# Patient Record
Sex: FEMALE | Race: WHITE | NOT HISPANIC OR LATINO | Employment: FULL TIME | URBAN - METROPOLITAN AREA
[De-identification: names, ages, dates, MRNs, and addresses within clinical notes are randomized per-mention and may not be internally consistent; named-entity substitution may affect disease eponyms.]

---

## 2018-05-30 ENCOUNTER — HISTORICAL (OUTPATIENT)
Dept: LAB | Facility: HOSPITAL | Age: 43
End: 2018-05-30

## 2018-05-30 LAB
ABS NEUT (OLG): 2.44 X10(3)/MCL (ref 2.1–9.2)
ALBUMIN SERPL-MCNC: 4.2 GM/DL (ref 3.4–5)
ALBUMIN/GLOB SERPL: 1.3 {RATIO}
ALP SERPL-CCNC: 99 UNIT/L (ref 38–126)
ALT SERPL-CCNC: 35 UNIT/L (ref 12–78)
AST SERPL-CCNC: 18 UNIT/L (ref 15–37)
BASOPHILS # BLD AUTO: 0 X10(3)/MCL (ref 0–0.2)
BASOPHILS NFR BLD AUTO: 0 %
BILIRUB SERPL-MCNC: 0.5 MG/DL (ref 0.2–1)
BILIRUBIN DIRECT+TOT PNL SERPL-MCNC: 0.1 MG/DL (ref 0–0.2)
BILIRUBIN DIRECT+TOT PNL SERPL-MCNC: 0.4 MG/DL (ref 0–0.8)
BUN SERPL-MCNC: 6 MG/DL (ref 7–18)
CALCIUM SERPL-MCNC: 8.9 MG/DL (ref 8.5–10.1)
CHLORIDE SERPL-SCNC: 101 MMOL/L (ref 98–107)
CHOLEST SERPL-MCNC: 212 MG/DL (ref 0–200)
CHOLEST/HDLC SERPL: 4.5 {RATIO} (ref 0–4)
CO2 SERPL-SCNC: 29 MMOL/L (ref 21–32)
CREAT SERPL-MCNC: 0.69 MG/DL (ref 0.55–1.02)
EOSINOPHIL # BLD AUTO: 0 X10(3)/MCL (ref 0–0.9)
EOSINOPHIL NFR BLD AUTO: 1 %
ERYTHROCYTE [DISTWIDTH] IN BLOOD BY AUTOMATED COUNT: 12 % (ref 11.5–17)
GLOBULIN SER-MCNC: 3.3 GM/DL (ref 2.4–3.5)
GLUCOSE SERPL-MCNC: 76 MG/DL (ref 74–106)
HCT VFR BLD AUTO: 45 % (ref 37–47)
HDLC SERPL-MCNC: 47 MG/DL (ref 35–60)
HGB BLD-MCNC: 15.2 GM/DL (ref 12–16)
LDLC SERPL CALC-MCNC: 138 MG/DL (ref 0–129)
LYMPHOCYTES # BLD AUTO: 2.4 X10(3)/MCL (ref 0.6–4.6)
LYMPHOCYTES NFR BLD AUTO: 44 %
MCH RBC QN AUTO: 33.4 PG (ref 27–31)
MCHC RBC AUTO-ENTMCNC: 33.8 GM/DL (ref 33–36)
MCV RBC AUTO: 98.9 FL (ref 80–94)
MONOCYTES # BLD AUTO: 0.6 X10(3)/MCL (ref 0.1–1.3)
MONOCYTES NFR BLD AUTO: 10 %
NEUTROPHILS # BLD AUTO: 2.44 X10(3)/MCL (ref 2.1–9.2)
NEUTROPHILS NFR BLD AUTO: 44 %
PLATELET # BLD AUTO: 218 X10(3)/MCL (ref 130–400)
PMV BLD AUTO: 10.6 FL (ref 9.4–12.4)
POTASSIUM SERPL-SCNC: 3.6 MMOL/L (ref 3.5–5.1)
PROT SERPL-MCNC: 7.5 GM/DL (ref 6.4–8.2)
RBC # BLD AUTO: 4.55 X10(6)/MCL (ref 4.2–5.4)
SODIUM SERPL-SCNC: 140 MMOL/L (ref 136–145)
TRIGL SERPL-MCNC: 134 MG/DL (ref 30–150)
TSH SERPL-ACNC: 0.99 MIU/L (ref 0.36–3.74)
VLDLC SERPL CALC-MCNC: 27 MG/DL
WBC # SPEC AUTO: 5.5 X10(3)/MCL (ref 4.5–11.5)

## 2019-05-14 ENCOUNTER — HISTORICAL (OUTPATIENT)
Dept: LAB | Facility: HOSPITAL | Age: 44
End: 2019-05-14

## 2019-05-14 LAB
ABS NEUT (OLG): 7 X10(3)/MCL (ref 2.1–9.2)
ALBUMIN SERPL-MCNC: 3.7 GM/DL (ref 3.4–5)
ALBUMIN/GLOB SERPL: 1.2 RATIO (ref 1.1–2)
ALP SERPL-CCNC: 94 UNIT/L (ref 38–126)
ALT SERPL-CCNC: 23 UNIT/L (ref 12–78)
AST SERPL-CCNC: 13 UNIT/L (ref 15–37)
BASOPHILS # BLD AUTO: 0 X10(3)/MCL (ref 0–0.2)
BASOPHILS NFR BLD AUTO: 0 %
BILIRUB SERPL-MCNC: 0.4 MG/DL (ref 0.2–1)
BILIRUBIN DIRECT+TOT PNL SERPL-MCNC: 0.1 MG/DL (ref 0–0.5)
BILIRUBIN DIRECT+TOT PNL SERPL-MCNC: 0.3 MG/DL (ref 0–0.8)
BUN SERPL-MCNC: 7 MG/DL (ref 7–18)
CALCIUM SERPL-MCNC: 8.9 MG/DL (ref 8.5–10.1)
CHLORIDE SERPL-SCNC: 106 MMOL/L (ref 98–107)
CHOLEST SERPL-MCNC: 179 MG/DL (ref 0–200)
CHOLEST/HDLC SERPL: 3.4 {RATIO} (ref 0–4)
CO2 SERPL-SCNC: 31 MMOL/L (ref 21–32)
CREAT SERPL-MCNC: 0.68 MG/DL (ref 0.55–1.02)
EOSINOPHIL # BLD AUTO: 0.1 X10(3)/MCL (ref 0–0.9)
EOSINOPHIL NFR BLD AUTO: 1 %
ERYTHROCYTE [DISTWIDTH] IN BLOOD BY AUTOMATED COUNT: 12.5 % (ref 11.5–17)
GLOBULIN SER-MCNC: 3.1 GM/DL (ref 2.4–3.5)
GLUCOSE SERPL-MCNC: 92 MG/DL (ref 74–106)
HCT VFR BLD AUTO: 45.5 % (ref 37–47)
HDLC SERPL-MCNC: 52 MG/DL (ref 35–60)
HGB BLD-MCNC: 15 GM/DL (ref 12–16)
LDLC SERPL CALC-MCNC: 106 MG/DL (ref 0–129)
LYMPHOCYTES # BLD AUTO: 2 X10(3)/MCL (ref 0.6–4.6)
LYMPHOCYTES NFR BLD AUTO: 20 %
MCH RBC QN AUTO: 34.6 PG (ref 27–31)
MCHC RBC AUTO-ENTMCNC: 33 GM/DL (ref 33–36)
MCV RBC AUTO: 104.8 FL (ref 80–94)
MONOCYTES # BLD AUTO: 0.8 X10(3)/MCL (ref 0.1–1.3)
MONOCYTES NFR BLD AUTO: 8 %
NEUTROPHILS # BLD AUTO: 7 X10(3)/MCL (ref 2.1–9.2)
NEUTROPHILS NFR BLD AUTO: 70 %
PLATELET # BLD AUTO: 263 X10(3)/MCL (ref 130–400)
PMV BLD AUTO: 10.8 FL (ref 9.4–12.4)
POTASSIUM SERPL-SCNC: 3.6 MMOL/L (ref 3.5–5.1)
PROT SERPL-MCNC: 6.8 GM/DL (ref 6.4–8.2)
RBC # BLD AUTO: 4.34 X10(6)/MCL (ref 4.2–5.4)
SODIUM SERPL-SCNC: 139 MMOL/L (ref 136–145)
TRIGL SERPL-MCNC: 103 MG/DL (ref 30–150)
TSH SERPL-ACNC: 0.92 MIU/L (ref 0.36–3.74)
VLDLC SERPL CALC-MCNC: 21 MG/DL
WBC # SPEC AUTO: 9.9 X10(3)/MCL (ref 4.5–11.5)

## 2021-04-01 ENCOUNTER — HISTORICAL (OUTPATIENT)
Dept: RADIOLOGY | Facility: HOSPITAL | Age: 46
End: 2021-04-01

## 2021-05-11 ENCOUNTER — HISTORICAL (OUTPATIENT)
Dept: ADMINISTRATIVE | Facility: HOSPITAL | Age: 46
End: 2021-05-11

## 2021-05-11 LAB
ABS NEUT (OLG): 5.27 X10(3)/MCL (ref 2.1–9.2)
ALBUMIN SERPL-MCNC: 4.1 GM/DL (ref 3.5–5)
ALBUMIN/GLOB SERPL: 1.4 RATIO (ref 1.1–2)
ALP SERPL-CCNC: 130 UNIT/L (ref 40–150)
ALT SERPL-CCNC: 23 UNIT/L (ref 0–55)
AST SERPL-CCNC: 19 UNIT/L (ref 5–34)
BASOPHILS # BLD AUTO: 0 X10(3)/MCL (ref 0–0.2)
BASOPHILS NFR BLD AUTO: 0 %
BILIRUB SERPL-MCNC: 0.4 MG/DL
BILIRUBIN DIRECT+TOT PNL SERPL-MCNC: 0.2 MG/DL (ref 0–0.5)
BILIRUBIN DIRECT+TOT PNL SERPL-MCNC: 0.2 MG/DL (ref 0–0.8)
BUN SERPL-MCNC: 6 MG/DL (ref 7–18.7)
CALCIUM SERPL-MCNC: 9.7 MG/DL (ref 8.4–10.2)
CHLORIDE SERPL-SCNC: 103 MMOL/L (ref 98–107)
CHOLEST SERPL-MCNC: 201 MG/DL
CHOLEST/HDLC SERPL: 4 {RATIO} (ref 0–5)
CO2 SERPL-SCNC: 29 MMOL/L (ref 22–29)
CREAT SERPL-MCNC: 0.84 MG/DL (ref 0.55–1.02)
EOSINOPHIL # BLD AUTO: 0.1 X10(3)/MCL (ref 0–0.9)
EOSINOPHIL NFR BLD AUTO: 1 %
ERYTHROCYTE [DISTWIDTH] IN BLOOD BY AUTOMATED COUNT: 12.8 % (ref 11.5–17)
GLOBULIN SER-MCNC: 3 GM/DL (ref 2.4–3.5)
GLUCOSE SERPL-MCNC: 95 MG/DL (ref 74–100)
HCT VFR BLD AUTO: 45.3 % (ref 37–47)
HDLC SERPL-MCNC: 47 MG/DL (ref 35–60)
HGB BLD-MCNC: 14.9 GM/DL (ref 12–16)
LDLC SERPL CALC-MCNC: 124 MG/DL (ref 50–140)
LYMPHOCYTES # BLD AUTO: 2.9 X10(3)/MCL (ref 0.6–4.6)
LYMPHOCYTES NFR BLD AUTO: 33 %
MCH RBC QN AUTO: 33.5 PG (ref 27–31)
MCHC RBC AUTO-ENTMCNC: 32.9 GM/DL (ref 33–36)
MCV RBC AUTO: 101.8 FL (ref 80–94)
MONOCYTES # BLD AUTO: 0.5 X10(3)/MCL (ref 0.1–1.3)
MONOCYTES NFR BLD AUTO: 6 %
NEUTROPHILS # BLD AUTO: 5.27 X10(3)/MCL (ref 2.1–9.2)
NEUTROPHILS NFR BLD AUTO: 60 %
PLATELET # BLD AUTO: 251 X10(3)/MCL (ref 130–400)
PMV BLD AUTO: 10.6 FL (ref 9.4–12.4)
POTASSIUM SERPL-SCNC: 3.9 MMOL/L (ref 3.5–5.1)
PROT SERPL-MCNC: 7.1 GM/DL (ref 6.4–8.3)
RBC # BLD AUTO: 4.45 X10(6)/MCL (ref 4.2–5.4)
SODIUM SERPL-SCNC: 141 MMOL/L (ref 136–145)
TRIGL SERPL-MCNC: 152 MG/DL (ref 37–140)
TSH SERPL-ACNC: 0.78 UIU/ML (ref 0.35–4.94)
VLDLC SERPL CALC-MCNC: 30 MG/DL
WBC # SPEC AUTO: 8.8 X10(3)/MCL (ref 4.5–11.5)

## 2021-08-13 LAB — RAPID GROUP A STREP (OHS): NEGATIVE

## 2021-08-15 LAB
RAPID GROUP A STREP (OHS): NEGATIVE
SARS-COV-2 RNA RESP QL NAA+PROBE: NEGATIVE

## 2021-12-27 ENCOUNTER — HISTORICAL (OUTPATIENT)
Dept: ADMINISTRATIVE | Facility: HOSPITAL | Age: 46
End: 2021-12-27

## 2021-12-27 LAB
ABS NEUT (OLG): 6.96 X10(3)/MCL (ref 2.1–9.2)
ALBUMIN SERPL-MCNC: 3.7 GM/DL (ref 3.5–5)
ALBUMIN/GLOB SERPL: 1.4 RATIO (ref 1.1–2)
ALP SERPL-CCNC: 102 UNIT/L (ref 40–150)
ALT SERPL-CCNC: 18 UNIT/L (ref 0–55)
AST SERPL-CCNC: 21 UNIT/L (ref 5–34)
BASOPHILS # BLD AUTO: 0 X10(3)/MCL (ref 0–0.2)
BASOPHILS NFR BLD AUTO: 0 %
BILIRUB SERPL-MCNC: 0.8 MG/DL
BILIRUBIN DIRECT+TOT PNL SERPL-MCNC: 0.3 MG/DL (ref 0–0.5)
BILIRUBIN DIRECT+TOT PNL SERPL-MCNC: 0.5 MG/DL (ref 0–0.8)
BUN SERPL-MCNC: 7.4 MG/DL (ref 7–18.7)
CALCIUM SERPL-MCNC: 9.4 MG/DL (ref 8.7–10.5)
CHLORIDE SERPL-SCNC: 103 MMOL/L (ref 98–107)
CHOLEST SERPL-MCNC: 192 MG/DL
CHOLEST/HDLC SERPL: 4 {RATIO} (ref 0–5)
CO2 SERPL-SCNC: 27 MMOL/L (ref 22–29)
CREAT SERPL-MCNC: 0.74 MG/DL (ref 0.55–1.02)
DEPRECATED CALCIDIOL+CALCIFEROL SERPL-MC: 40.9 NG/ML (ref 30–80)
EOSINOPHIL # BLD AUTO: 0.1 X10(3)/MCL (ref 0–0.9)
EOSINOPHIL NFR BLD AUTO: 1 %
ERYTHROCYTE [DISTWIDTH] IN BLOOD BY AUTOMATED COUNT: 12.5 % (ref 11.5–17)
ESTRADIOL SERPL HS-MCNC: 62 PG/ML
GLOBULIN SER-MCNC: 2.7 GM/DL (ref 2.4–3.5)
GLUCOSE SERPL-MCNC: 84 MG/DL (ref 74–100)
HCT VFR BLD AUTO: 40.9 % (ref 37–47)
HDLC SERPL-MCNC: 47 MG/DL (ref 35–60)
HGB BLD-MCNC: 13.8 GM/DL (ref 12–16)
LDLC SERPL CALC-MCNC: 121 MG/DL (ref 50–140)
LYMPHOCYTES # BLD AUTO: 2.7 X10(3)/MCL (ref 0.6–4.6)
LYMPHOCYTES NFR BLD AUTO: 26 %
MCH RBC QN AUTO: 33.7 PG (ref 27–31)
MCHC RBC AUTO-ENTMCNC: 33.7 GM/DL (ref 33–36)
MCV RBC AUTO: 100 FL (ref 80–94)
MONOCYTES # BLD AUTO: 0.7 X10(3)/MCL (ref 0.1–1.3)
MONOCYTES NFR BLD AUTO: 6 %
NEUTROPHILS # BLD AUTO: 6.96 X10(3)/MCL (ref 2.1–9.2)
NEUTROPHILS NFR BLD AUTO: 67 %
PLATELET # BLD AUTO: 237 X10(3)/MCL (ref 130–400)
PMV BLD AUTO: 10.6 FL (ref 9.4–12.4)
POTASSIUM SERPL-SCNC: 3.5 MMOL/L (ref 3.5–5.1)
PROT SERPL-MCNC: 6.4 GM/DL (ref 6.4–8.3)
RBC # BLD AUTO: 4.09 X10(6)/MCL (ref 4.2–5.4)
SODIUM SERPL-SCNC: 139 MMOL/L (ref 136–145)
TRIGL SERPL-MCNC: 119 MG/DL (ref 37–140)
TSH SERPL-ACNC: 0.87 UIU/ML (ref 0.35–4.94)
VLDLC SERPL CALC-MCNC: 24 MG/DL
WBC # SPEC AUTO: 10.4 X10(3)/MCL (ref 4.5–11.5)

## 2021-12-29 LAB
INFLUENZA A ANTIGEN, POC: NEGATIVE
INFLUENZA B ANTIGEN, POC: NEGATIVE
SARS-COV-2 RNA RESP QL NAA+PROBE: POSITIVE

## 2022-04-10 ENCOUNTER — HISTORICAL (OUTPATIENT)
Dept: ADMINISTRATIVE | Facility: HOSPITAL | Age: 47
End: 2022-04-10
Payer: COMMERCIAL

## 2022-04-27 VITALS
SYSTOLIC BLOOD PRESSURE: 127 MMHG | BODY MASS INDEX: 25.83 KG/M2 | DIASTOLIC BLOOD PRESSURE: 83 MMHG | OXYGEN SATURATION: 98 % | WEIGHT: 155 LBS | HEIGHT: 65 IN

## 2022-06-01 ENCOUNTER — OFFICE VISIT (OUTPATIENT)
Dept: PRIMARY CARE CLINIC | Facility: CLINIC | Age: 47
End: 2022-06-01
Payer: COMMERCIAL

## 2022-06-01 VITALS
DIASTOLIC BLOOD PRESSURE: 71 MMHG | HEART RATE: 83 BPM | SYSTOLIC BLOOD PRESSURE: 111 MMHG | OXYGEN SATURATION: 97 % | WEIGHT: 165.5 LBS | BODY MASS INDEX: 27.91 KG/M2

## 2022-06-01 DIAGNOSIS — R79.89 ABNORMAL LEVEL OF FEMALE SEX HORMONES: ICD-10-CM

## 2022-06-01 DIAGNOSIS — Z00.00 WELLNESS EXAMINATION: ICD-10-CM

## 2022-06-01 DIAGNOSIS — E78.5 HYPERLIPIDEMIA, UNSPECIFIED HYPERLIPIDEMIA TYPE: ICD-10-CM

## 2022-06-01 DIAGNOSIS — F41.1 GAD (GENERALIZED ANXIETY DISORDER): ICD-10-CM

## 2022-06-01 DIAGNOSIS — F17.210 CIGARETTE SMOKER: Primary | ICD-10-CM

## 2022-06-01 PROCEDURE — 99396 PREV VISIT EST AGE 40-64: CPT | Mod: ,,, | Performed by: FAMILY MEDICINE

## 2022-06-01 PROCEDURE — 3078F DIAST BP <80 MM HG: CPT | Mod: CPTII,,, | Performed by: FAMILY MEDICINE

## 2022-06-01 PROCEDURE — 99396 PR PREVENTIVE VISIT,EST,40-64: ICD-10-PCS | Mod: ,,, | Performed by: FAMILY MEDICINE

## 2022-06-01 PROCEDURE — 4010F PR ACE/ARB THEARPY RXD/TAKEN: ICD-10-PCS | Mod: CPTII,,, | Performed by: FAMILY MEDICINE

## 2022-06-01 PROCEDURE — 4010F ACE/ARB THERAPY RXD/TAKEN: CPT | Mod: CPTII,,, | Performed by: FAMILY MEDICINE

## 2022-06-01 PROCEDURE — 3074F SYST BP LT 130 MM HG: CPT | Mod: CPTII,,, | Performed by: FAMILY MEDICINE

## 2022-06-01 PROCEDURE — 3008F PR BODY MASS INDEX (BMI) DOCUMENTED: ICD-10-PCS | Mod: CPTII,,, | Performed by: FAMILY MEDICINE

## 2022-06-01 PROCEDURE — 1160F PR REVIEW ALL MEDS BY PRESCRIBER/CLIN PHARMACIST DOCUMENTED: ICD-10-PCS | Mod: CPTII,,, | Performed by: FAMILY MEDICINE

## 2022-06-01 PROCEDURE — 3078F PR MOST RECENT DIASTOLIC BLOOD PRESSURE < 80 MM HG: ICD-10-PCS | Mod: CPTII,,, | Performed by: FAMILY MEDICINE

## 2022-06-01 PROCEDURE — 1159F PR MEDICATION LIST DOCUMENTED IN MEDICAL RECORD: ICD-10-PCS | Mod: CPTII,,, | Performed by: FAMILY MEDICINE

## 2022-06-01 PROCEDURE — 1159F MED LIST DOCD IN RCRD: CPT | Mod: CPTII,,, | Performed by: FAMILY MEDICINE

## 2022-06-01 PROCEDURE — 3074F PR MOST RECENT SYSTOLIC BLOOD PRESSURE < 130 MM HG: ICD-10-PCS | Mod: CPTII,,, | Performed by: FAMILY MEDICINE

## 2022-06-01 PROCEDURE — 1160F RVW MEDS BY RX/DR IN RCRD: CPT | Mod: CPTII,,, | Performed by: FAMILY MEDICINE

## 2022-06-01 PROCEDURE — 3008F BODY MASS INDEX DOCD: CPT | Mod: CPTII,,, | Performed by: FAMILY MEDICINE

## 2022-06-01 RX ORDER — AMITRIPTYLINE HYDROCHLORIDE 100 MG/1
100 TABLET ORAL NIGHTLY
Qty: 30 TABLET | Refills: 5 | Status: SHIPPED | OUTPATIENT
Start: 2022-06-01 | End: 2023-01-19

## 2022-06-01 RX ORDER — ALPRAZOLAM 0.5 MG/1
0.5 TABLET ORAL NIGHTLY PRN
COMMUNITY
Start: 2022-04-27 | End: 2023-01-03

## 2022-06-01 RX ORDER — VALACYCLOVIR HYDROCHLORIDE 1 G/1
1000 TABLET, FILM COATED ORAL 2 TIMES DAILY
COMMUNITY
Start: 2022-05-16

## 2022-06-01 RX ORDER — RIZATRIPTAN BENZOATE 10 MG/1
TABLET ORAL
COMMUNITY
Start: 2021-12-27 | End: 2023-01-19

## 2022-06-01 RX ORDER — BUDESONIDE AND FORMOTEROL FUMARATE DIHYDRATE 80; 4.5 UG/1; UG/1
AEROSOL RESPIRATORY (INHALATION)
COMMUNITY
Start: 2021-08-13

## 2022-06-01 RX ORDER — HYOSCYAMINE SULFATE 0.12 MG/1
TABLET SUBLINGUAL
COMMUNITY
Start: 2021-12-28 | End: 2023-01-19

## 2022-06-01 RX ORDER — ESCITALOPRAM OXALATE 20 MG/1
TABLET ORAL
COMMUNITY
Start: 2021-12-04 | End: 2022-10-10 | Stop reason: SDUPTHER

## 2022-06-01 RX ORDER — AMITRIPTYLINE HYDROCHLORIDE 50 MG/1
100 TABLET, FILM COATED ORAL NIGHTLY
COMMUNITY
Start: 2022-05-25 | End: 2022-06-01 | Stop reason: SDUPTHER

## 2022-06-01 RX ORDER — PREDNISONE 20 MG/1
40 TABLET ORAL
COMMUNITY
Start: 2021-08-13 | End: 2023-01-19

## 2022-06-01 RX ORDER — LISINOPRIL AND HYDROCHLOROTHIAZIDE 12.5; 2 MG/1; MG/1
TABLET ORAL
COMMUNITY
Start: 2021-12-14 | End: 2022-07-08

## 2022-06-01 RX ORDER — GABAPENTIN 300 MG/1
300 CAPSULE ORAL 2 TIMES DAILY
COMMUNITY
Start: 2022-05-16 | End: 2023-01-19 | Stop reason: SDUPTHER

## 2022-06-01 RX ORDER — POTASSIUM CHLORIDE 600 MG/1
CAPSULE, EXTENDED RELEASE ORAL
COMMUNITY
Start: 2021-12-04 | End: 2022-08-22

## 2022-06-01 RX ORDER — ATENOLOL 25 MG/1
TABLET ORAL
COMMUNITY
Start: 2021-10-12 | End: 2023-02-14 | Stop reason: SDUPTHER

## 2022-06-01 RX ORDER — BUTALBITAL, ACETAMINOPHEN AND CAFFEINE 300; 40; 50 MG/1; MG/1; MG/1
CAPSULE ORAL
COMMUNITY
Start: 2022-01-03 | End: 2022-10-10 | Stop reason: SDUPTHER

## 2022-06-01 RX ORDER — ESTRADIOL 0.05 MG/D
PATCH TRANSDERMAL
COMMUNITY
Start: 2021-12-17 | End: 2023-01-19

## 2022-06-01 NOTE — ASSESSMENT & PLAN NOTE
Recommend CBT and mindfulness therapy with biofeedback techniques and deep breathing exercises, continue current medication regimen with increase of Elavil from 50 to 100mg QHS

## 2022-06-01 NOTE — PROGRESS NOTES
Chief Complaint  Chief Complaint   Patient presents with    Follow-up     Without labs       History of Present Illness  Patient presents to clinic for routine scheduled follow-up wellness visit.  She was intended to have lab work performed prior to the visit but no lab work was performed.  At her last visit 1 month prior she complained of acutely worsening baseline depression and anxiety with anxiety being the predominant factor and had been compliant with SSRI for several years but found that in the preceding month it was no longer effective in controlling her anxiety symptoms without known cause.  She was also complaining of intermittent headaches with migraine/tension component and insomnia and the decision was made to initiate Elavil at her last visit which she states had for 1 month significantly helped to some with all issues, but 2 weeks ago she had another shingles outbreak (first in 4 years) and strep throat and is now just getting over the shingles pain in the past week but in the past 7 days her depression symptoms have returned without SI/HI.      PMH:  Hypertension-a and atenolol 25 mg daily, lisinopril-HCTZ, previously on amlodipine  Tobacco abuse-21 pack year smoking history  Anxiety-Lexapro 20 mg daily, Xanax 0.5 mg 3 times daily as needed, Elavil  Multiple shingles outbreaks with post herpetic neuralgia-started in late 30s, last about 2018, gabapentin 300 mg 3 times daily as needed, previously Valtrex 500 mg daily  Osteopenia    Specialist:    Screening:  CT calcium score 06/28/2018, score 0  DEXA-04/2021, osteopenia    Review of Systems  Review of Systems   Constitutional: Negative for chills and fever.   HENT: Negative for congestion and sore throat.    Eyes: Negative for redness and visual disturbance.   Respiratory: Negative for cough and shortness of breath.    Cardiovascular: Negative for chest pain and palpitations.   Gastrointestinal: Negative for nausea and vomiting.    Musculoskeletal: Negative for arthralgias and myalgias.   Skin: Negative for pallor and rash.   Neurological: Negative for dizziness and headaches.   Psychiatric/Behavioral: Negative for agitation and dysphoric mood.       Physical Exam  Physical Exam  Constitutional:       Appearance: Normal appearance.   HENT:      Head: Normocephalic and atraumatic.   Eyes:      General: No scleral icterus.     Conjunctiva/sclera: Conjunctivae normal.   Cardiovascular:      Rate and Rhythm: Normal rate and regular rhythm.   Pulmonary:      Effort: Pulmonary effort is normal.      Breath sounds: Normal breath sounds.   Skin:     General: Skin is warm and dry.   Neurological:      General: No focal deficit present.      Mental Status: She is alert and oriented to person, place, and time.   Psychiatric:         Mood and Affect: Mood normal.         Behavior: Behavior normal.         Problem List/Past Medical History  Past Medical History:   Diagnosis Date    Anxiety disorder, unspecified     Asymptomatic varicose veins of unspecified lower extremity     Diverticulitis     Endometriosis, unspecified     HTN (hypertension)     Other specified noninfective gastroenteritis and colitis        Procedure/Surgical History  Past Surgical History:   Procedure Laterality Date    hemorroid operation      SINUS SURGERY      TOTAL ABDOMINAL HYSTERECTOMY         Medications  Current Outpatient Medications on File Prior to Visit   Medication Sig Dispense Refill    ALPRAZolam (XANAX) 0.5 MG tablet Take 0.5 mg by mouth nightly as needed.      gabapentin (NEURONTIN) 300 MG capsule Take 300 mg by mouth 2 (two) times daily.      valACYclovir (VALTREX) 1000 MG tablet Take 1,000 mg by mouth 2 (two) times daily.      [DISCONTINUED] amitriptyline (ELAVIL) 50 MG tablet Take 100 mg by mouth nightly.       No current facility-administered medications on file prior to visit.       Allegies  Review of patient's allergies indicates:   Allergen  Reactions    Aspirin Shortness Of Breath     Other reaction(s): severe asthma attack    Ibuprofen      Other reaction(s): severe asthma attack    Penicillins      Other reaction(s): severe asthma attack    Codeine Anxiety     Other reaction(s): makes her anxious        Social History  Social History     Socioeconomic History    Marital status:    Tobacco Use    Smoking status: Current Every Day Smoker     Packs/day: 1.00    Smokeless tobacco: Never Used   Substance and Sexual Activity    Alcohol use: Yes    Drug use: Never    Sexual activity: Yes       Family History  History reviewed. No pertinent family history.      Immunizations    There is no immunization history on file for this patient.    Health Maintenance  Health Maintenance   Topic Date Due    Hepatitis C Screening  Never done    TETANUS VACCINE  Never done    Mammogram  12/02/2022    Lipid Panel  12/27/2026        Assessment / Plan  Problem List Items Addressed This Visit        Psychiatric    DIGNA (generalized anxiety disorder)    Current Assessment & Plan     Recommend CBT and mindfulness therapy with biofeedback techniques and deep breathing exercises, continue current medication regimen with increase of Elavil from 50 to 100mg QHS              Other    Wellness examination    Current Assessment & Plan     Discussed routine annual health maintenance and screening in addition to acute issues noted below.             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

## 2022-06-01 NOTE — ASSESSMENT & PLAN NOTE
Discussed routine annual health maintenance and screening in addition to acute issues noted below.

## 2022-06-01 NOTE — PROGRESS NOTES
Chief Complaint  Chief Complaint   Patient presents with    Follow-up     Without labs       History of Present Illness  Patient presents to clinic for routine scheduled follow-up wellness visit.  She was intended to have lab work performed prior to the visit but no lab work was performed.  At her last visit 1 month prior she complained of acutely worsening baseline depression and anxiety with anxiety being the predominant factor and had been compliant with SSRI for several years but found that in the preceding month it was no longer effective in controlling her anxiety symptoms without known cause.  She was also complaining of intermittent headaches with migraine/tension component and insomnia and the decision was made to initiate Elavil at her last visit which she states had for 1 month significantly helped to some with all issues, but 2 weeks ago she had another shingles outbreak (first in 4 years) and strep throat and is now just getting over the shingles pain in the past week but in the past 7 days her depression symptoms have returned without SI/HI.      PMH:  Hypertension-a and atenolol 25 mg daily, lisinopril-HCTZ, previously on amlodipine  Tobacco abuse-21 pack year smoking history  Anxiety-Lexapro 20 mg daily, Xanax 0.5 mg 3 times daily as needed, Elavil  Multiple shingles outbreaks with post herpetic neuralgia-started in late 30s, last about 2018, gabapentin 300 mg 3 times daily as needed, previously Valtrex 500 mg daily  Osteopenia    Specialist:    Screening:  CT calcium score 06/28/2018, score 0  DEXA-04/2021, osteopenia    Review of Systems  Review of Systems   Constitutional: Negative for chills and fever.   HENT: Negative for congestion and sore throat.    Eyes: Negative for redness and visual disturbance.   Respiratory: Negative for cough and shortness of breath.    Cardiovascular: Negative for chest pain and palpitations.   Gastrointestinal: Negative for nausea and vomiting.    Musculoskeletal: Negative for arthralgias and myalgias.   Skin: Negative for pallor and rash.   Neurological: Negative for dizziness and headaches.   Psychiatric/Behavioral: Negative for agitation and dysphoric mood.       Physical Exam  Physical Exam  Constitutional:       Appearance: Normal appearance.   HENT:      Head: Normocephalic and atraumatic.   Eyes:      General: No scleral icterus.     Conjunctiva/sclera: Conjunctivae normal.   Cardiovascular:      Rate and Rhythm: Normal rate and regular rhythm.   Pulmonary:      Effort: Pulmonary effort is normal.      Breath sounds: Normal breath sounds.   Skin:     General: Skin is warm and dry.   Neurological:      General: No focal deficit present.      Mental Status: She is alert and oriented to person, place, and time.   Psychiatric:         Mood and Affect: Mood normal.         Behavior: Behavior normal.         Problem List/Past Medical History  Past Medical History:   Diagnosis Date    Anxiety disorder, unspecified     Asymptomatic varicose veins of unspecified lower extremity     Diverticulitis     Endometriosis, unspecified     HTN (hypertension)     Other specified noninfective gastroenteritis and colitis        Procedure/Surgical History  Past Surgical History:   Procedure Laterality Date    hemorroid operation      SINUS SURGERY      TOTAL ABDOMINAL HYSTERECTOMY         Medications  Current Outpatient Medications on File Prior to Visit   Medication Sig Dispense Refill    ALPRAZolam (XANAX) 0.5 MG tablet Take 0.5 mg by mouth nightly as needed.      amitriptyline (ELAVIL) 50 MG tablet Take 50 mg by mouth nightly.      gabapentin (NEURONTIN) 300 MG capsule Take 300 mg by mouth 2 (two) times daily.      valACYclovir (VALTREX) 1000 MG tablet Take 1,000 mg by mouth 2 (two) times daily.       No current facility-administered medications on file prior to visit.       Allegies  Review of patient's allergies indicates:   Allergen Reactions     Aspirin Shortness Of Breath     Other reaction(s): severe asthma attack    Ibuprofen      Other reaction(s): severe asthma attack    Penicillins      Other reaction(s): severe asthma attack    Codeine Anxiety     Other reaction(s): makes her anxious        Social History  Social History     Socioeconomic History    Marital status:    Tobacco Use    Smoking status: Current Every Day Smoker     Packs/day: 1.00    Smokeless tobacco: Never Used   Substance and Sexual Activity    Alcohol use: Yes    Drug use: Never    Sexual activity: Yes       Family History  History reviewed. No pertinent family history.      Immunizations    There is no immunization history on file for this patient.    Health Maintenance  Health Maintenance   Topic Date Due    Hepatitis C Screening  Never done    TETANUS VACCINE  Never done    Mammogram  12/02/2022    Lipid Panel  12/27/2026        Assessment / Plan  Problem List Items Addressed This Visit        Psychiatric    DIGNA (generalized anxiety disorder)    Current Assessment & Plan     Recommend CBT and mindfulness therapy with biofeedback techniques and deep breathing exercises, continue current medication regimen              Other    Wellness examination    Current Assessment & Plan     Discussed routine annual health maintenance and screening in addition to acute issues noted below.             Other Visit Diagnoses     Cigarette smoker    -  Primary          RTC 3 months    Librado Menon

## 2022-06-29 ENCOUNTER — PATIENT MESSAGE (OUTPATIENT)
Dept: PRIMARY CARE CLINIC | Facility: CLINIC | Age: 47
End: 2022-06-29
Payer: COMMERCIAL

## 2022-08-08 ENCOUNTER — OFFICE VISIT (OUTPATIENT)
Dept: URGENT CARE | Facility: CLINIC | Age: 47
End: 2022-08-08
Payer: COMMERCIAL

## 2022-08-08 VITALS
BODY MASS INDEX: 28.17 KG/M2 | DIASTOLIC BLOOD PRESSURE: 88 MMHG | OXYGEN SATURATION: 97 % | HEIGHT: 64 IN | SYSTOLIC BLOOD PRESSURE: 144 MMHG | TEMPERATURE: 98 F | RESPIRATION RATE: 20 BRPM | WEIGHT: 165 LBS | HEART RATE: 88 BPM

## 2022-08-08 DIAGNOSIS — R05.9 COUGH: Primary | ICD-10-CM

## 2022-08-08 DIAGNOSIS — J20.9 ACUTE BRONCHITIS, UNSPECIFIED ORGANISM: ICD-10-CM

## 2022-08-08 LAB
CTP QC/QA: YES
SARS-COV-2 RDRP RESP QL NAA+PROBE: NEGATIVE

## 2022-08-08 PROCEDURE — 3077F PR MOST RECENT SYSTOLIC BLOOD PRESSURE >= 140 MM HG: ICD-10-PCS | Mod: CPTII,,, | Performed by: PHYSICIAN ASSISTANT

## 2022-08-08 PROCEDURE — 1160F RVW MEDS BY RX/DR IN RCRD: CPT | Mod: CPTII,,, | Performed by: PHYSICIAN ASSISTANT

## 2022-08-08 PROCEDURE — 96372 PR INJECTION,THERAP/PROPH/DIAG2ST, IM OR SUBCUT: ICD-10-PCS | Mod: S$PBB,,, | Performed by: PHYSICIAN ASSISTANT

## 2022-08-08 PROCEDURE — 3077F SYST BP >= 140 MM HG: CPT | Mod: CPTII,,, | Performed by: PHYSICIAN ASSISTANT

## 2022-08-08 PROCEDURE — U0002: ICD-10-PCS | Mod: QW,,, | Performed by: PHYSICIAN ASSISTANT

## 2022-08-08 PROCEDURE — 3079F DIAST BP 80-89 MM HG: CPT | Mod: CPTII,,, | Performed by: PHYSICIAN ASSISTANT

## 2022-08-08 PROCEDURE — 1159F MED LIST DOCD IN RCRD: CPT | Mod: CPTII,,, | Performed by: PHYSICIAN ASSISTANT

## 2022-08-08 PROCEDURE — 99213 OFFICE O/P EST LOW 20 MIN: CPT | Mod: S$PBB,25,, | Performed by: PHYSICIAN ASSISTANT

## 2022-08-08 PROCEDURE — 3079F PR MOST RECENT DIASTOLIC BLOOD PRESSURE 80-89 MM HG: ICD-10-PCS | Mod: CPTII,,, | Performed by: PHYSICIAN ASSISTANT

## 2022-08-08 PROCEDURE — U0002 COVID-19 LAB TEST NON-CDC: HCPCS | Mod: QW,,, | Performed by: PHYSICIAN ASSISTANT

## 2022-08-08 PROCEDURE — 4010F ACE/ARB THERAPY RXD/TAKEN: CPT | Mod: CPTII,,, | Performed by: PHYSICIAN ASSISTANT

## 2022-08-08 PROCEDURE — 4010F PR ACE/ARB THEARPY RXD/TAKEN: ICD-10-PCS | Mod: CPTII,,, | Performed by: PHYSICIAN ASSISTANT

## 2022-08-08 PROCEDURE — 99213 PR OFFICE/OUTPT VISIT, EST, LEVL III, 20-29 MIN: ICD-10-PCS | Mod: S$PBB,25,, | Performed by: PHYSICIAN ASSISTANT

## 2022-08-08 PROCEDURE — 3008F PR BODY MASS INDEX (BMI) DOCUMENTED: ICD-10-PCS | Mod: CPTII,,, | Performed by: PHYSICIAN ASSISTANT

## 2022-08-08 PROCEDURE — 96372 THER/PROPH/DIAG INJ SC/IM: CPT | Mod: S$PBB,,, | Performed by: PHYSICIAN ASSISTANT

## 2022-08-08 PROCEDURE — 1160F PR REVIEW ALL MEDS BY PRESCRIBER/CLIN PHARMACIST DOCUMENTED: ICD-10-PCS | Mod: CPTII,,, | Performed by: PHYSICIAN ASSISTANT

## 2022-08-08 PROCEDURE — 3008F BODY MASS INDEX DOCD: CPT | Mod: CPTII,,, | Performed by: PHYSICIAN ASSISTANT

## 2022-08-08 PROCEDURE — 1159F PR MEDICATION LIST DOCUMENTED IN MEDICAL RECORD: ICD-10-PCS | Mod: CPTII,,, | Performed by: PHYSICIAN ASSISTANT

## 2022-08-08 RX ORDER — DEXAMETHASONE SODIUM PHOSPHATE 100 MG/10ML
10 INJECTION INTRAMUSCULAR; INTRAVENOUS
Status: COMPLETED | OUTPATIENT
Start: 2022-08-08 | End: 2022-08-08

## 2022-08-08 RX ORDER — METHYLPREDNISOLONE 4 MG/1
TABLET ORAL
Qty: 1 EACH | Refills: 0 | Status: SHIPPED | OUTPATIENT
Start: 2022-08-08 | End: 2023-01-19

## 2022-08-08 RX ORDER — ALBUTEROL SULFATE 1.25 MG/3ML
1.25 SOLUTION RESPIRATORY (INHALATION) EVERY 6 HOURS PRN
Qty: 75 ML | Refills: 0 | Status: SHIPPED | OUTPATIENT
Start: 2022-08-08 | End: 2022-09-07

## 2022-08-08 RX ORDER — PROMETHAZINE HYDROCHLORIDE AND DEXTROMETHORPHAN HYDROBROMIDE 6.25; 15 MG/5ML; MG/5ML
5 SYRUP ORAL EVERY 6 HOURS PRN
Qty: 100 ML | Refills: 0 | Status: SHIPPED | OUTPATIENT
Start: 2022-08-08 | End: 2022-08-13

## 2022-08-08 RX ADMIN — DEXAMETHASONE SODIUM PHOSPHATE 10 MG: 100 INJECTION INTRAMUSCULAR; INTRAVENOUS at 09:08

## 2022-08-08 NOTE — LETTER
August 8, 2022    Marilynn Robles  [address]  [place] [address]         [facility]  [facility]  [address]  [place] [address]  Phone: [phone] Below are the results from your recent visit:      Results for orders placed or performed in visit on 08/08/22   POCT COVID-19 Rapid Screening   Result Value Ref Range    POC Rapid COVID Negative Negative     Acceptable Yes

## 2022-08-08 NOTE — LETTER
August 8, 2022      [facility]  [address]  [place] [address]  Phone: [phone]       Patient: Marilynn Robles   YOB: 1975  Date of Visit: 08/08/2022    To Whom It May Concern:    Jennifer Robles  was at [facility] on 08/08/2022. The patient may return to work/school on 08/09/2022 with no restrictions. If you have any questions or concerns, or if I can be of further assistance, please do not hesitate to contact me.    Sincerely,    Lazara Guerrero MA

## 2022-08-08 NOTE — PROGRESS NOTES
"Subjective:       Patient ID: Marilynn Robles is a 47 y.o. female.    Vitals:  height is 5' 4" (1.626 m) and weight is 74.8 kg (165 lb). Her oral temperature is 98.2 °F (36.8 °C). Her blood pressure is 144/88 (abnormal) and her pulse is 88. Her respiration is 20 and oxygen saturation is 97%.     Chief Complaint: Sinus Problem    Female smoker with severe cough and wheeze onset yesterday worse overnight out of albuterol ampules for home nebulizer small relief with personal MDI presents to clinic for initial testing.    Sinus Problem  The current episode started yesterday. Associated symptoms include congestion, coughing, headaches, sinus pressure and a sore throat. Pertinent negatives include no chills, ear pain, neck pain or shortness of breath. (Body aches )       Constitution: Negative for chills, fatigue and fever.   HENT: Positive for congestion, sinus pressure and sore throat. Negative for ear pain, sinus pain, trouble swallowing and voice change.    Neck: Negative for neck pain and neck swelling.   Cardiovascular: Negative for chest pain.   Respiratory: Positive for cough and wheezing. Negative for shortness of breath and stridor.    Gastrointestinal: Negative.    Musculoskeletal: Negative for pain, joint pain, back pain and muscle ache.   Skin: Negative.    Allergic/Immunologic: Negative.    Neurological: Positive for headaches. Negative for altered mental status.   Psychiatric/Behavioral: Negative for altered mental status.       Objective:      Physical Exam   Constitutional: She is oriented to person, place, and time. She appears well-developed. She is cooperative.  Non-toxic appearance. She does not appear ill. No distress.      Comments:Awake alert ambulatory nasally congested female speaks in complete sentences     HENT:   Head: Normocephalic.   Ears:   Right Ear: Hearing, tympanic membrane, external ear and ear canal normal.   Left Ear: Hearing, tympanic membrane, external ear and ear canal normal. "   Nose: Congestion present. No mucosal edema, rhinorrhea or nasal deformity. No epistaxis. Right sinus exhibits no maxillary sinus tenderness and no frontal sinus tenderness. Left sinus exhibits no maxillary sinus tenderness and no frontal sinus tenderness.   Mouth/Throat: Uvula is midline, oropharynx is clear and moist and mucous membranes are normal. No trismus in the jaw. Normal dentition. No uvula swelling. No oropharyngeal exudate, posterior oropharyngeal edema or posterior oropharyngeal erythema.   Eyes: Conjunctivae and lids are normal. No scleral icterus.   Neck: Trachea normal and phonation normal. Neck supple. No edema present. No erythema present. No neck rigidity present.   Cardiovascular: Normal rate, regular rhythm, normal heart sounds and normal pulses.   Pulmonary/Chest: Effort normal and breath sounds normal. No respiratory distress. She has no decreased breath sounds. She has no wheezes. She has no rhonchi. She has no rales.   Abdominal: Normal appearance.   Musculoskeletal: Normal range of motion.         General: No swelling. Normal range of motion.      Cervical back: She exhibits no tenderness.   Neurological: no focal deficit. She is alert and oriented to person, place, and time. She exhibits normal muscle tone. Coordination normal.   Skin: Skin is warm, dry, intact and not diaphoretic.   Psychiatric: Her speech is normal and behavior is normal. Mood, judgment and thought content normal.   Nursing note and vitals reviewed.             Previous History      Review of patient's allergies indicates:   Allergen Reactions    Aspirin Shortness Of Breath     Other reaction(s): severe asthma attack    Ibuprofen      Other reaction(s): severe asthma attack    Penicillins      Other reaction(s): severe asthma attack    Codeine Anxiety     Other reaction(s): makes her anxious       Past Medical History:   Diagnosis Date    Anxiety disorder, unspecified     Asymptomatic varicose veins of unspecified  lower extremity     Diverticulitis     Endometriosis, unspecified     HTN (hypertension)     Other specified noninfective gastroenteritis and colitis      Current Outpatient Medications   Medication Instructions    albuterol (ACCUNEB) 1.25 mg, Nebulization, Every 6 hours PRN, Rescue    ALPRAZolam (XANAX) 0.5 mg, Oral, Nightly PRN    amitriptyline (ELAVIL) 100 mg, Oral, Nightly    atenoloL (TENORMIN) 25 MG tablet   See Instructions, Take 1 tablet by mouth once daily, # 90 tab(s), 3 Refill(s), Pharmacy: [id number], 164, cm, Height/Length Dosing, 08/15/21 8:13:00 CDT, 65.7, kg, Weight Dosing, 08/15/21 8:13:00 CDT    budesonide-formoterol 80-4.5 mcg (SYMBICORT) 80-4.5 mcg/actuation HFAA Inhalation    butalbital-acetaminophen-caff -40 mg Cap Oral    EScitalopram oxalate (LEXAPRO) 20 MG tablet No dose, route, or frequency recorded.    estradiol 0.05 mg/24 hr td ptwk 0.05 mg/24 hr PTWK No dose, route, or frequency recorded.    gabapentin (NEURONTIN) 300 mg, Oral, 2 times daily    hyoscyamine (LEVSIN/SL) 0.125 mg Subl No dose, route, or frequency recorded.    lisinopriL-hydrochlorothiazide (PRINZIDE,ZESTORETIC) 20-12.5 mg per tablet Take 1 tablet by mouth once daily    methylPREDNISolone (MEDROL DOSEPACK) 4 mg tablet use as directed    potassium chloride (MICRO-K) 8 mEq CpSR No dose, route, or frequency recorded.    predniSONE (DELTASONE) 40 mg, Oral    promethazine-dextromethorphan (PROMETHAZINE-DM) 6.25-15 mg/5 mL Syrp 5 mLs, Oral, Every 6 hours PRN    rizatriptan (MAXALT) 10 MG tablet No dose, route, or frequency recorded.    valACYclovir (VALTREX) 1,000 mg, Oral, 2 times daily     Past Surgical History:   Procedure Laterality Date    hemorroid operation      SINUS SURGERY      TOTAL ABDOMINAL HYSTERECTOMY       History reviewed. No pertinent family history.    Social History     Tobacco Use    Smoking status: Current Every Day Smoker     Packs/day: 1.00     Types: Cigarettes     "Smokeless tobacco: Never Used   Substance Use Topics    Alcohol use: Yes    Drug use: Never        Physical Exam      Vital Signs Reviewed   BP (!) 144/88   Pulse 88   Temp 98.2 °F (36.8 °C) (Oral)   Resp 20   Ht 5' 4" (1.626 m)   Wt 74.8 kg (165 lb)   SpO2 97%   BMI 28.32 kg/m²        Procedures    Procedures     Labs     Results for orders placed or performed in visit on 08/08/22   POCT COVID-19 Rapid Screening   Result Value Ref Range    POC Rapid COVID Negative Negative     Acceptable Yes        Assessment:       1. Cough    2. Acute bronchitis, unspecified organism          Plan:       Negative Covid test today though still recommend viral precautions and potential repeat home COVID testing and 2-3 days.  Start steroid Dosepak tomorrow in addition to steroid injection received today to help reduce congestion inflammation.  Albuterol inhaler or nebulizer treatment 4-6 times daily as needed for cough wheeze or shortness of breath.  Recommend smoking cessation short and long-term.  Phenergan dm sparingly lows does as needed for severe cough and rest.  Recommend follow-up with primary care physician in 3-5 days for re-evaluation if not improving.  Cough  -     POCT COVID-19 Rapid Screening    Acute bronchitis, unspecified organism  -     methylPREDNISolone (MEDROL DOSEPACK) 4 mg tablet; use as directed  Dispense: 1 each; Refill: 0  -     promethazine-dextromethorphan (PROMETHAZINE-DM) 6.25-15 mg/5 mL Syrp; Take 5 mLs by mouth every 6 (six) hours as needed (cough).  Dispense: 100 mL; Refill: 0  -     albuterol (ACCUNEB) 1.25 mg/3 mL Nebu; Take 3 mLs (1.25 mg total) by nebulization every 6 (six) hours as needed (cough, wheeze or shortness of breath). Rescue  Dispense: 75 mL; Refill: 0    Other orders  -     dexamethasone injection 10 mg                   "

## 2022-08-08 NOTE — PATIENT INSTRUCTIONS
Negative Covid test today though still recommend viral precautions and potential repeat home COVID testing and 2-3 days.  Start steroid Dosepak tomorrow in addition to steroid injection received today to help reduce congestion inflammation.  Albuterol inhaler or nebulizer treatment 4-6 times daily as needed for cough wheeze or shortness of breath.  Recommend smoking cessation short and long-term.  Phenergan dm sparingly lows does as needed for severe cough and rest.  Recommend follow-up with primary care physician in 3-5 days for re-evaluation if not improving.

## 2022-08-09 ENCOUNTER — PATIENT MESSAGE (OUTPATIENT)
Dept: PRIMARY CARE CLINIC | Facility: CLINIC | Age: 47
End: 2022-08-09
Payer: COMMERCIAL

## 2022-08-16 ENCOUNTER — OFFICE VISIT (OUTPATIENT)
Dept: PRIMARY CARE CLINIC | Facility: CLINIC | Age: 47
End: 2022-08-16
Payer: COMMERCIAL

## 2022-08-16 DIAGNOSIS — B37.0 ORAL THRUSH: Primary | ICD-10-CM

## 2022-08-16 PROCEDURE — 99213 PR OFFICE/OUTPT VISIT, EST, LEVL III, 20-29 MIN: ICD-10-PCS | Mod: 95,,, | Performed by: FAMILY MEDICINE

## 2022-08-16 PROCEDURE — 1160F PR REVIEW ALL MEDS BY PRESCRIBER/CLIN PHARMACIST DOCUMENTED: ICD-10-PCS | Mod: CPTII,95,, | Performed by: FAMILY MEDICINE

## 2022-08-16 PROCEDURE — 1159F MED LIST DOCD IN RCRD: CPT | Mod: CPTII,95,, | Performed by: FAMILY MEDICINE

## 2022-08-16 PROCEDURE — 99213 OFFICE O/P EST LOW 20 MIN: CPT | Mod: 95,,, | Performed by: FAMILY MEDICINE

## 2022-08-16 PROCEDURE — 1160F RVW MEDS BY RX/DR IN RCRD: CPT | Mod: CPTII,95,, | Performed by: FAMILY MEDICINE

## 2022-08-16 PROCEDURE — 1159F PR MEDICATION LIST DOCUMENTED IN MEDICAL RECORD: ICD-10-PCS | Mod: CPTII,95,, | Performed by: FAMILY MEDICINE

## 2022-08-16 PROCEDURE — 4010F PR ACE/ARB THEARPY RXD/TAKEN: ICD-10-PCS | Mod: CPTII,95,, | Performed by: FAMILY MEDICINE

## 2022-08-16 PROCEDURE — 4010F ACE/ARB THERAPY RXD/TAKEN: CPT | Mod: CPTII,95,, | Performed by: FAMILY MEDICINE

## 2022-08-16 RX ORDER — NYSTATIN 100000 [USP'U]/ML
6 SUSPENSION ORAL
Qty: 240 ML | Refills: 0 | Status: SHIPPED | OUTPATIENT
Start: 2022-08-16 | End: 2022-08-26

## 2022-08-16 NOTE — PROGRESS NOTES
Chief Complaint  Chief Complaint   Patient presents with    Thrush     Oral thrush, went to        History of Present Illness   This note is documentation of a telemedicine encounter.  The patient was treated using real-time audio, according to [facility] protocols. I, distant provider, conducted the visit from a remote office location, also in accordance with [facility] protocols. The patient participated in the visit at a non-[facility] location, which was selected by the patient (or patient´s representative), the patient's home. I am licensed in [place], which is the state where the patient stated they were located during this appointment. The patient (or patient´s representative) stated that they understood and accepted the privacy and security risks to their information at their location.    Patient complains of having white film over her tongue over the past 3 days with associated sore throat.  She does have a prior history of thrush that had previously been treated with oral antifungal medications in previous years and reports that the symptoms do seem similar to this.  She denies any other acute complaints or concerns at this time    PMH:  Hypertension-a and atenolol 25 mg daily, lisinopril-HCTZ, previously on amlodipine  Tobacco abuse-21 pack year smoking history  Anxiety-Lexapro 20 mg daily, Xanax 0.5 mg 3 times daily as needed, Elavil  Multiple shingles outbreaks with post herpetic neuralgia-started in late 30s, last about 2018, gabapentin 300 mg 3 times daily as needed, previously Valtrex 500 mg daily  Osteopenia    Specialist:    Screening:  CT calcium score 06/28/2018, score 0  DEXA-04/2021, osteopenia    Review of Systems  Review of Systems   Constitutional: Positive for unexpected weight change. Negative for activity change, chills and fever.   HENT: Positive for sore throat. Negative for congestion, hearing loss, rhinorrhea and trouble swallowing.         White film on tongue   Eyes: Negative for discharge,  redness and visual disturbance.   Respiratory: Negative for cough, chest tightness, shortness of breath and wheezing.    Cardiovascular: Negative for chest pain and palpitations.   Gastrointestinal: Negative for blood in stool, constipation, diarrhea, nausea and vomiting.   Endocrine: Negative for polydipsia and polyuria.   Genitourinary: Negative for difficulty urinating, dysuria, hematuria and menstrual problem.   Musculoskeletal: Negative for arthralgias, joint swelling, myalgias and neck pain.   Skin: Negative for pallor and rash.   Neurological: Negative for dizziness, weakness and headaches.   Psychiatric/Behavioral: Negative for agitation, confusion and dysphoric mood.       Physical Exam  Limited due to telephonic visit     Problem List/Past Medical History  Past Medical History:   Diagnosis Date    Anxiety disorder, unspecified     Asymptomatic varicose veins of unspecified lower extremity     Diverticulitis     Endometriosis, unspecified     HTN (hypertension)     Other specified noninfective gastroenteritis and colitis        Procedure/Surgical History  Past Surgical History:   Procedure Laterality Date    hemorroid operation      SINUS SURGERY      TOTAL ABDOMINAL HYSTERECTOMY         Medications  Current Outpatient Medications on File Prior to Visit   Medication Sig Dispense Refill    albuterol (ACCUNEB) 1.25 mg/3 mL Nebu Take 3 mLs (1.25 mg total) by nebulization every 6 (six) hours as needed (cough, wheeze or shortness of breath). Rescue 75 mL 0    ALPRAZolam (XANAX) 0.5 MG tablet Take 0.5 mg by mouth nightly as needed.      amitriptyline (ELAVIL) 100 MG tablet Take 1 tablet (100 mg total) by mouth every evening. 30 tablet 5    atenoloL (TENORMIN) 25 MG tablet   See Instructions, Take 1 tablet by mouth once daily, # 90 tab(s), 3 Refill(s), Pharmacy: [facility], 164, cm, Height/Length Dosing, 08/15/21 8:13:00 CDT, 65.7, kg, Weight Dosing, 08/15/21 8:13:00 CDT       budesonide-formoterol 80-4.5 mcg (SYMBICORT) 80-4.5 mcg/actuation HFAA Inhale into the lungs.      butalbital-acetaminophen-caff -40 mg Cap Take by mouth.      EScitalopram oxalate (LEXAPRO) 20 MG tablet       estradiol 0.05 mg/24 hr td ptwk 0.05 mg/24 hr PTWK       gabapentin (NEURONTIN) 300 MG capsule Take 300 mg by mouth 2 (two) times daily.      hyoscyamine (LEVSIN/SL) 0.125 mg Subl       lisinopriL-hydrochlorothiazide (PRINZIDE,ZESTORETIC) 20-12.5 mg per tablet Take 1 tablet by mouth once daily. 90 tablet 0    methylPREDNISolone (MEDROL DOSEPACK) 4 mg tablet use as directed 1 each 0    potassium chloride (MICRO-K) 8 mEq CpSR       predniSONE (DELTASONE) 20 MG tablet Take 40 mg by mouth.      rizatriptan (MAXALT) 10 MG tablet       valACYclovir (VALTREX) 1000 MG tablet Take 1,000 mg by mouth 2 (two) times daily.       No current facility-administered medications on file prior to visit.       Allegies  Review of patient's allergies indicates:   Allergen Reactions    Aspirin Shortness Of Breath     Other reaction(s): severe asthma attack    Ibuprofen      Other reaction(s): severe asthma attack    Penicillins      Other reaction(s): severe asthma attack    Codeine Anxiety     Other reaction(s): makes her anxious        Social History  Social History     Socioeconomic History    Marital status:    Tobacco Use    Smoking status: Current Every Day Smoker     Packs/day: 1.00     Types: Cigarettes    Smokeless tobacco: Never Used   Substance and Sexual Activity    Alcohol use: Yes    Drug use: Never    Sexual activity: Yes       Family History  History reviewed. No pertinent family history.      Immunizations  Immunization History   Administered Date(s) Administered    COVID-19, MRNA, LN-S, PF (Pfizer) (Purple Cap) 08/27/2021, 09/17/2021    Influenza - Quadrivalent - PF *Preferred* (6 months and older) 10/17/2018, 09/29/2020    Influenza - Trivalent - PF (ADULT) 11/20/2019        Health Maintenance  Health Maintenance   Topic Date Due    Hepatitis C Screening  Never done    TETANUS VACCINE  Never done    Mammogram  12/02/2022    Lipid Panel  12/27/2026        Assessment / Plan  Problem List Items Addressed This Visit    None     Visit Diagnoses     Oral thrush    -  Primary      Prescribe nystatin 10 day course and monitor for symptom improvement    RTC 1 months    Librado Menon

## 2022-08-29 ENCOUNTER — LAB VISIT (OUTPATIENT)
Dept: LAB | Facility: HOSPITAL | Age: 47
End: 2022-08-29
Attending: FAMILY MEDICINE
Payer: COMMERCIAL

## 2022-08-29 DIAGNOSIS — Z00.00 WELLNESS EXAMINATION: ICD-10-CM

## 2022-08-29 DIAGNOSIS — E78.5 HYPERLIPIDEMIA, UNSPECIFIED HYPERLIPIDEMIA TYPE: ICD-10-CM

## 2022-08-29 DIAGNOSIS — R79.89 ABNORMAL LEVEL OF FEMALE SEX HORMONES: ICD-10-CM

## 2022-08-29 LAB
ALBUMIN SERPL-MCNC: 3.6 GM/DL (ref 3.5–5)
ALBUMIN/GLOB SERPL: 1.3 RATIO (ref 1.1–2)
ALP SERPL-CCNC: 146 UNIT/L (ref 40–150)
ALT SERPL-CCNC: 23 UNIT/L (ref 0–55)
AST SERPL-CCNC: 17 UNIT/L (ref 5–34)
BASOPHILS # BLD AUTO: 0.03 X10(3)/MCL (ref 0–0.2)
BASOPHILS NFR BLD AUTO: 0.4 %
BILIRUBIN DIRECT+TOT PNL SERPL-MCNC: 0.5 MG/DL
BUN SERPL-MCNC: 6.9 MG/DL (ref 7–18.7)
CALCIUM SERPL-MCNC: 9.2 MG/DL (ref 8.4–10.2)
CHLORIDE SERPL-SCNC: 102 MMOL/L (ref 98–107)
CHOLEST SERPL-MCNC: 207 MG/DL
CHOLEST/HDLC SERPL: 5 {RATIO} (ref 0–5)
CO2 SERPL-SCNC: 30 MMOL/L (ref 22–29)
CREAT SERPL-MCNC: 0.78 MG/DL (ref 0.55–1.02)
EOSINOPHIL # BLD AUTO: 0.06 X10(3)/MCL (ref 0–0.9)
EOSINOPHIL NFR BLD AUTO: 0.7 %
ERYTHROCYTE [DISTWIDTH] IN BLOOD BY AUTOMATED COUNT: 13.2 % (ref 11.5–17)
ESTRADIOL SERPL HS-MCNC: <24 PG/ML
GFR SERPLBLD CREATININE-BSD FMLA CKD-EPI: >60 MLS/MIN/1.73/M2
GLOBULIN SER-MCNC: 2.8 GM/DL (ref 2.4–3.5)
GLUCOSE SERPL-MCNC: 93 MG/DL (ref 74–100)
HCT VFR BLD AUTO: 40.4 % (ref 37–47)
HDLC SERPL-MCNC: 43 MG/DL (ref 35–60)
HGB BLD-MCNC: 13.7 GM/DL (ref 12–16)
IMM GRANULOCYTES # BLD AUTO: 0.03 X10(3)/MCL (ref 0–0.04)
IMM GRANULOCYTES NFR BLD AUTO: 0.4 %
LDLC SERPL CALC-MCNC: 142 MG/DL (ref 50–140)
LYMPHOCYTES # BLD AUTO: 2.22 X10(3)/MCL (ref 0.6–4.6)
LYMPHOCYTES NFR BLD AUTO: 27.2 %
MCH RBC QN AUTO: 34.3 PG (ref 27–31)
MCHC RBC AUTO-ENTMCNC: 33.9 MG/DL (ref 33–36)
MCV RBC AUTO: 101.3 FL (ref 80–94)
MONOCYTES # BLD AUTO: 0.57 X10(3)/MCL (ref 0.1–1.3)
MONOCYTES NFR BLD AUTO: 7 %
NEUTROPHILS # BLD AUTO: 5.2 X10(3)/MCL (ref 2.1–9.2)
NEUTROPHILS NFR BLD AUTO: 64.3 %
NRBC BLD AUTO-RTO: 0 %
PLATELET # BLD AUTO: 242 X10(3)/MCL (ref 130–400)
PMV BLD AUTO: 10.1 FL (ref 7.4–10.4)
POTASSIUM SERPL-SCNC: 3.2 MMOL/L (ref 3.5–5.1)
PROGEST SERPL-MCNC: <0.5 NG/ML
PROT SERPL-MCNC: 6.4 GM/DL (ref 6.4–8.3)
RBC # BLD AUTO: 3.99 X10(6)/MCL (ref 4.2–5.4)
SODIUM SERPL-SCNC: 139 MMOL/L (ref 136–145)
TRIGL SERPL-MCNC: 112 MG/DL (ref 37–140)
TSH SERPL-ACNC: 1.04 UIU/ML (ref 0.35–4.94)
VLDLC SERPL CALC-MCNC: 22 MG/DL
WBC # SPEC AUTO: 8.2 X10(3)/MCL (ref 4.5–11.5)

## 2022-08-29 PROCEDURE — 36415 COLL VENOUS BLD VENIPUNCTURE: CPT

## 2022-08-29 PROCEDURE — 84443 ASSAY THYROID STIM HORMONE: CPT

## 2022-08-29 PROCEDURE — 83718 ASSAY OF LIPOPROTEIN: CPT

## 2022-08-29 PROCEDURE — 85025 COMPLETE CBC W/AUTO DIFF WBC: CPT

## 2022-08-29 PROCEDURE — 82670 ASSAY OF TOTAL ESTRADIOL: CPT

## 2022-08-29 PROCEDURE — 80053 COMPREHEN METABOLIC PANEL: CPT

## 2022-08-29 PROCEDURE — 84144 ASSAY OF PROGESTERONE: CPT

## 2022-09-05 PROBLEM — Z00.00 WELLNESS EXAMINATION: Status: RESOLVED | Noted: 2022-06-01 | Resolved: 2022-09-05

## 2022-09-07 ENCOUNTER — OFFICE VISIT (OUTPATIENT)
Dept: PRIMARY CARE CLINIC | Facility: CLINIC | Age: 47
End: 2022-09-07
Payer: COMMERCIAL

## 2022-09-07 DIAGNOSIS — U07.1 COVID-19: ICD-10-CM

## 2022-09-07 DIAGNOSIS — E87.6 HYPOKALEMIA: ICD-10-CM

## 2022-09-07 DIAGNOSIS — F41.1 GAD (GENERALIZED ANXIETY DISORDER): Primary | ICD-10-CM

## 2022-09-07 PROCEDURE — 99214 PR OFFICE/OUTPT VISIT, EST, LEVL IV, 30-39 MIN: ICD-10-PCS | Mod: 95,,, | Performed by: FAMILY MEDICINE

## 2022-09-07 PROCEDURE — 1160F RVW MEDS BY RX/DR IN RCRD: CPT | Mod: CPTII,95,, | Performed by: FAMILY MEDICINE

## 2022-09-07 PROCEDURE — 99214 OFFICE O/P EST MOD 30 MIN: CPT | Mod: 95,,, | Performed by: FAMILY MEDICINE

## 2022-09-07 PROCEDURE — 4010F ACE/ARB THERAPY RXD/TAKEN: CPT | Mod: CPTII,95,, | Performed by: FAMILY MEDICINE

## 2022-09-07 PROCEDURE — 1160F PR REVIEW ALL MEDS BY PRESCRIBER/CLIN PHARMACIST DOCUMENTED: ICD-10-PCS | Mod: CPTII,95,, | Performed by: FAMILY MEDICINE

## 2022-09-07 PROCEDURE — 1159F MED LIST DOCD IN RCRD: CPT | Mod: CPTII,95,, | Performed by: FAMILY MEDICINE

## 2022-09-07 PROCEDURE — 1159F PR MEDICATION LIST DOCUMENTED IN MEDICAL RECORD: ICD-10-PCS | Mod: CPTII,95,, | Performed by: FAMILY MEDICINE

## 2022-09-07 PROCEDURE — 4010F PR ACE/ARB THEARPY RXD/TAKEN: ICD-10-PCS | Mod: CPTII,95,, | Performed by: FAMILY MEDICINE

## 2022-09-07 RX ORDER — POTASSIUM CHLORIDE 750 MG/1
10 CAPSULE, EXTENDED RELEASE ORAL DAILY
Qty: 90 CAPSULE | Refills: 3 | Status: SHIPPED | OUTPATIENT
Start: 2022-09-07 | End: 2023-02-14 | Stop reason: SDUPTHER

## 2022-09-07 RX ORDER — BENZONATATE 200 MG/1
200 CAPSULE ORAL 3 TIMES DAILY PRN
Qty: 30 CAPSULE | Refills: 0 | Status: SHIPPED | OUTPATIENT
Start: 2022-09-07 | End: 2022-09-17

## 2022-09-07 NOTE — ASSESSMENT & PLAN NOTE
Following completion of COVID management as detailed below we will discontinue amitriptyline in follow-up in 1 month for re-evaluation of depression and anxiety symptoms due to unwanted side effect of weight gain.  Continue Lexapro at this time.

## 2022-09-07 NOTE — PROGRESS NOTES
Chief Complaint  Chief Complaint   Patient presents with    Follow-up     Pt has covid, since 9/2/22, cough, congestion, SOB, HA, sore throat, diarrhea       History of Present Illness  This note is documentation of a telemedicine encounter.  The patient was treated using real-time audio and video, according to [facility] protocols. I, distant provider, conducted the visit from a remote office location, also in accordance with [facility] protocols. The patient participated in the visit at a non-[facility] location, which was selected by the patient (or patient´s representative), the patient's home. I am licensed in [place], which is the state where the patient stated they were located during this appointment. The patient (or patient´s representative) stated that they understood and accepted the privacy and security risks to their information at their location.      Patient reports that 5 days ago on 09/02/2022 she developed symptoms concerning for COVID 19 and tested positive on that day.  Since that time she has had issues with intermittent shortness of breath which seems to be controlled with her home breathing treatments and a persistent cough not improved with over-the-counter medication.  Fevers have been intermittent but are not active at this time though she states she has generalized myalgia and fatigue.  She has been COVID vaccinated previously.  She also had blood work performed prior to this visit which included estrogen and progesterone levels at nondetectable ranges and chemistry panel with potassium at 3.2 despite daily use of 8 mEq which she has been on for several years since she was started on hydrochlorothiazide for blood pressure control.  She states that since amitriptyline was started and subsequently raised to 100 mg which she then de-escalated 50 mg has noticed a 20 lb weight gain and only several months and would like to get off of the medication stating that it is not extremely helpful with depression  control and is concerned about the side effects.    PMH:  Hypertension-atenolol 25 mg daily, lisinopril-HCTZ, previously on amlodipine  Tobacco abuse-21 pack year smoking history  Anxiety-Lexapro 20 mg daily, Xanax 0.5 mg 3 times daily as needed, Elavil  Multiple shingles outbreaks with post herpetic neuralgia-started in late 30s, last about 2018, gabapentin 300 mg 3 times daily as needed, previously Valtrex 500 mg daily  Osteopenia    Screening:  CT calcium score 06/28/2018, score 0  DEXA-04/2021, osteopenia    Review of Systems  Review of Systems   Constitutional:  Positive for chills, fatigue and fever.   HENT:  Positive for ear pain and rhinorrhea. Negative for congestion and sore throat.    Eyes:  Negative for redness and visual disturbance.   Respiratory:  Positive for cough, shortness of breath and wheezing.    Cardiovascular:  Negative for chest pain, palpitations and leg swelling.   Gastrointestinal:  Negative for abdominal pain, nausea and vomiting.   Musculoskeletal:  Positive for myalgias. Negative for arthralgias and neck pain.   Skin:  Negative for pallor and rash.   Neurological:  Positive for weakness. Negative for dizziness and headaches.   Psychiatric/Behavioral:  Negative for agitation and dysphoric mood.      Physical Exam  Limited due to telemedicine  Problem List/Past Medical History  Past Medical History:   Diagnosis Date    Anxiety disorder, unspecified     Asymptomatic varicose veins of unspecified lower extremity     Diverticulitis     Endometriosis, unspecified     HTN (hypertension)     Other specified noninfective gastroenteritis and colitis        Procedure/Surgical History  Past Surgical History:   Procedure Laterality Date    hemorroid operation      SINUS SURGERY      TOTAL ABDOMINAL HYSTERECTOMY         Medications  Current Outpatient Medications on File Prior to Visit   Medication Sig Dispense Refill    albuterol (ACCUNEB) 1.25 mg/3 mL Nebu Take 3 mLs (1.25 mg total) by  nebulization every 6 (six) hours as needed (cough, wheeze or shortness of breath). Rescue 75 mL 0    ALPRAZolam (XANAX) 0.5 MG tablet Take 0.5 mg by mouth nightly as needed.      amitriptyline (ELAVIL) 100 MG tablet Take 1 tablet (100 mg total) by mouth every evening. 30 tablet 5    atenoloL (TENORMIN) 25 MG tablet   See Instructions, Take 1 tablet by mouth once daily, # 90 tab(s), 3 Refill(s), Pharmacy: [facility], 164, cm, Height/Length Dosing, 08/15/21 8:13:00 CDT, 65.7, kg, Weight Dosing, 08/15/21 8:13:00 CDT      budesonide-formoterol 80-4.5 mcg (SYMBICORT) 80-4.5 mcg/actuation HFAA Inhale into the lungs.      butalbital-acetaminophen-caff -40 mg Cap Take by mouth.      EScitalopram oxalate (LEXAPRO) 20 MG tablet       estradiol 0.05 mg/24 hr td ptwk 0.05 mg/24 hr PTWK       gabapentin (NEURONTIN) 300 MG capsule Take 300 mg by mouth 2 (two) times daily.      hyoscyamine (LEVSIN/SL) 0.125 mg Subl       lisinopriL-hydrochlorothiazide (PRINZIDE,ZESTORETIC) 20-12.5 mg per tablet Take 1 tablet by mouth once daily. 90 tablet 0    methylPREDNISolone (MEDROL DOSEPACK) 4 mg tablet use as directed 1 each 0    predniSONE (DELTASONE) 20 MG tablet Take 40 mg by mouth.      rizatriptan (MAXALT) 10 MG tablet       valACYclovir (VALTREX) 1000 MG tablet Take 1,000 mg by mouth 2 (two) times daily.      [DISCONTINUED] potassium chloride (MICRO-K) 8 mEq CpSR Take 1 capsule by mouth once daily 30 capsule 0     No current facility-administered medications on file prior to visit.       Allegies  Review of patient's allergies indicates:   Allergen Reactions    Aspirin Shortness Of Breath     Other reaction(s): severe asthma attack    Ibuprofen      Other reaction(s): severe asthma attack    Penicillins      Other reaction(s): severe asthma attack    Codeine Anxiety     Other reaction(s): makes her anxious        Social History  Social History     Socioeconomic History    Marital status:    Tobacco Use     Smoking status: Every Day     Packs/day: 1.00     Types: Cigarettes    Smokeless tobacco: Never   Substance and Sexual Activity    Alcohol use: Yes    Drug use: Never    Sexual activity: Yes       Family History  History reviewed. No pertinent family history.      Immunizations  Immunization History   Administered Date(s) Administered    COVID-19, MRNA, LN-S, PF (Pfizer) (Purple Cap) 08/27/2021, 09/17/2021    Influenza - Quadrivalent - PF *Preferred* (6 months and older) 10/17/2018, 09/29/2020    Influenza - Trivalent - PF (ADULT) 11/20/2019       Health Maintenance  Health Maintenance   Topic Date Due    Hepatitis C Screening  Never done    TETANUS VACCINE  Never done    Mammogram  12/02/2022    Lipid Panel  08/29/2027        Assessment / Plan  Problem List Items Addressed This Visit          Psychiatric    DIGNA (generalized anxiety disorder) - Primary    Current Assessment & Plan     Following completion of COVID management as detailed below we will discontinue amitriptyline in follow-up in 1 month for re-evaluation of depression and anxiety symptoms due to unwanted side effect of weight gain.  Continue Lexapro at this time.            Renal/    Hypokalemia    Overview     Increase daily potassium from 8 up to 10 mEq            ID    COVID-19    Overview     Paxlovid for 5 days initiated today, Tessalon as needed for cough, ER precautions for respiratory distress despite current management with home breathing treatments as needed every 4 hours for persistent wheezing.          RTC 1 months    Librado Menon

## 2022-09-15 ENCOUNTER — PATIENT MESSAGE (OUTPATIENT)
Dept: PRIMARY CARE CLINIC | Facility: CLINIC | Age: 47
End: 2022-09-15
Payer: COMMERCIAL

## 2022-09-21 ENCOUNTER — HISTORICAL (OUTPATIENT)
Dept: ADMINISTRATIVE | Facility: HOSPITAL | Age: 47
End: 2022-09-21
Payer: COMMERCIAL

## 2022-09-27 DIAGNOSIS — J20.9 ACUTE BRONCHITIS, UNSPECIFIED ORGANISM: ICD-10-CM

## 2022-09-27 RX ORDER — ALBUTEROL SULFATE 90 UG/1
2 AEROSOL, METERED RESPIRATORY (INHALATION) EVERY 6 HOURS PRN
Qty: 18 G | Refills: 0 | Status: SHIPPED | OUTPATIENT
Start: 2022-09-27 | End: 2023-02-14 | Stop reason: SDUPTHER

## 2022-10-10 ENCOUNTER — OFFICE VISIT (OUTPATIENT)
Dept: PRIMARY CARE CLINIC | Facility: CLINIC | Age: 47
End: 2022-10-10
Payer: COMMERCIAL

## 2022-10-10 VITALS
DIASTOLIC BLOOD PRESSURE: 76 MMHG | OXYGEN SATURATION: 100 % | HEART RATE: 73 BPM | WEIGHT: 173.63 LBS | SYSTOLIC BLOOD PRESSURE: 125 MMHG | BODY MASS INDEX: 29.8 KG/M2

## 2022-10-10 DIAGNOSIS — N61.0 CELLULITIS OF RIGHT BREAST: ICD-10-CM

## 2022-10-10 DIAGNOSIS — I10 HYPERTENSION, UNSPECIFIED TYPE: ICD-10-CM

## 2022-10-10 DIAGNOSIS — E78.5 HYPERLIPIDEMIA, UNSPECIFIED HYPERLIPIDEMIA TYPE: ICD-10-CM

## 2022-10-10 DIAGNOSIS — Z00.00 WELLNESS EXAMINATION: Primary | ICD-10-CM

## 2022-10-10 PROCEDURE — 1160F PR REVIEW ALL MEDS BY PRESCRIBER/CLIN PHARMACIST DOCUMENTED: ICD-10-PCS | Mod: CPTII,,, | Performed by: FAMILY MEDICINE

## 2022-10-10 PROCEDURE — 1159F PR MEDICATION LIST DOCUMENTED IN MEDICAL RECORD: ICD-10-PCS | Mod: CPTII,,, | Performed by: FAMILY MEDICINE

## 2022-10-10 PROCEDURE — 3008F BODY MASS INDEX DOCD: CPT | Mod: CPTII,,, | Performed by: FAMILY MEDICINE

## 2022-10-10 PROCEDURE — 1160F RVW MEDS BY RX/DR IN RCRD: CPT | Mod: CPTII,,, | Performed by: FAMILY MEDICINE

## 2022-10-10 PROCEDURE — 3078F DIAST BP <80 MM HG: CPT | Mod: CPTII,,, | Performed by: FAMILY MEDICINE

## 2022-10-10 PROCEDURE — 99214 PR OFFICE/OUTPT VISIT, EST, LEVL IV, 30-39 MIN: ICD-10-PCS | Mod: ,,, | Performed by: FAMILY MEDICINE

## 2022-10-10 PROCEDURE — 4010F ACE/ARB THERAPY RXD/TAKEN: CPT | Mod: CPTII,,, | Performed by: FAMILY MEDICINE

## 2022-10-10 PROCEDURE — 3078F PR MOST RECENT DIASTOLIC BLOOD PRESSURE < 80 MM HG: ICD-10-PCS | Mod: CPTII,,, | Performed by: FAMILY MEDICINE

## 2022-10-10 PROCEDURE — 4010F PR ACE/ARB THEARPY RXD/TAKEN: ICD-10-PCS | Mod: CPTII,,, | Performed by: FAMILY MEDICINE

## 2022-10-10 PROCEDURE — 1159F MED LIST DOCD IN RCRD: CPT | Mod: CPTII,,, | Performed by: FAMILY MEDICINE

## 2022-10-10 PROCEDURE — 3008F PR BODY MASS INDEX (BMI) DOCUMENTED: ICD-10-PCS | Mod: CPTII,,, | Performed by: FAMILY MEDICINE

## 2022-10-10 PROCEDURE — 3074F SYST BP LT 130 MM HG: CPT | Mod: CPTII,,, | Performed by: FAMILY MEDICINE

## 2022-10-10 PROCEDURE — 3074F PR MOST RECENT SYSTOLIC BLOOD PRESSURE < 130 MM HG: ICD-10-PCS | Mod: CPTII,,, | Performed by: FAMILY MEDICINE

## 2022-10-10 PROCEDURE — 99214 OFFICE O/P EST MOD 30 MIN: CPT | Mod: ,,, | Performed by: FAMILY MEDICINE

## 2022-10-10 RX ORDER — MUPIROCIN 20 MG/G
OINTMENT TOPICAL
COMMUNITY
Start: 2022-10-06 | End: 2023-01-19

## 2022-10-10 RX ORDER — BUTALBITAL, ACETAMINOPHEN AND CAFFEINE 300; 40; 50 MG/1; MG/1; MG/1
1 CAPSULE ORAL 3 TIMES DAILY
Qty: 30 CAPSULE | Refills: 5 | Status: SHIPPED | OUTPATIENT
Start: 2022-10-10 | End: 2023-07-20 | Stop reason: SDUPTHER

## 2022-10-10 RX ORDER — CLINDAMYCIN HYDROCHLORIDE 300 MG/1
300 CAPSULE ORAL EVERY 8 HOURS
Qty: 30 CAPSULE | Refills: 0 | Status: SHIPPED | OUTPATIENT
Start: 2022-10-10 | End: 2023-01-19

## 2022-10-10 RX ORDER — LISINOPRIL AND HYDROCHLOROTHIAZIDE 12.5; 2 MG/1; MG/1
1 TABLET ORAL DAILY
Qty: 90 TABLET | Refills: 3 | Status: SHIPPED | OUTPATIENT
Start: 2022-10-10 | End: 2023-02-14 | Stop reason: SDUPTHER

## 2022-10-10 RX ORDER — LIDOCAINE 50 MG/G
CREAM TOPICAL
COMMUNITY
Start: 2022-10-05 | End: 2023-01-19

## 2022-10-10 RX ORDER — CIPROFLOXACIN HYDROCHLORIDE 500 MG/1
500 TABLET, FILM COATED ORAL 2 TIMES DAILY
COMMUNITY
Start: 2022-10-05 | End: 2023-01-19

## 2022-10-10 RX ORDER — ESCITALOPRAM OXALATE 20 MG/1
20 TABLET ORAL NIGHTLY
Qty: 90 TABLET | Refills: 3 | Status: SHIPPED | OUTPATIENT
Start: 2022-10-10 | End: 2023-02-14 | Stop reason: SDUPTHER

## 2022-10-10 NOTE — PROGRESS NOTES
Chief Complaint  Chief Complaint   Patient presents with    Follow-up     Medication changes       History of Present Illness  Patient presents to clinic for a routine follow-up with complaint of recent this is swelling noted to the base of the right breast she was assessed for this issue 3 days ago by a breast specialist referred from her gyn was given a Medrol Dosepak instructions to follow up in 2 weeks and had recently been placed on a 10 day course of ciprofloxacin by her GI specialist for thrombosed hemorrhoid which is also being treated with topical corticosteroids just had minimal improvement in states that after 3 days on the Medrol Dosepak is had no improvement of the swelling and pain at the base of her breast.  She is up-to-date on mammogram with her next routine mammogram scheduled in 3 months however had an ultrasound performed by the specialist of the breast with no signs of abscess and no obvious signs concerning for breast cancer.  Blood pressure is within normal limits in clinic at this time.      PMH:  Hypertension-atenolol 25 mg daily, lisinopril-HCTZ, previously on amlodipine  Tobacco abuse-21 pack year smoking history  Anxiety-Lexapro 20 mg daily, Xanax 0.5 mg 3 times daily as needed, previously Elavil  Multiple shingles outbreaks with post herpetic neuralgia-started in late 30s, last about 2018, gabapentin 300 mg 3 times daily as needed, previously Valtrex 500 mg daily  Osteopenia    Screening:  CT calcium score 06/28/2018, score 0  DEXA-04/2021, osteopenia    Review of Systems  Review of Systems   Constitutional:  Negative for chills, fatigue and fever.   HENT:  Negative for congestion and sore throat.    Eyes:  Negative for redness and visual disturbance.   Respiratory:  Negative for cough, shortness of breath and wheezing.    Cardiovascular:  Negative for chest pain, palpitations and leg swelling.   Gastrointestinal:  Negative for abdominal pain, nausea and vomiting.        Painful  hemorrhoid   Musculoskeletal:  Positive for myalgias. Negative for arthralgias and neck pain.        Breast pain/redness/swelling   Skin:  Negative for pallor and rash.   Neurological:  Negative for dizziness and headaches.   Psychiatric/Behavioral:  Negative for agitation and dysphoric mood.      Physical Exam    Physical Exam  Constitutional:       Appearance: Normal appearance.   HENT:      Head: Normocephalic and atraumatic.   Eyes:      General: No scleral icterus.     Conjunctiva/sclera: Conjunctivae normal.   Cardiovascular:      Rate and Rhythm: Normal rate and regular rhythm.   Pulmonary:      Effort: Pulmonary effort is normal.      Breath sounds: Normal breath sounds.   Genitourinary:     Comments: Rectal exam deferred  Musculoskeletal:      Comments: Breast exam deferred   Skin:     General: Skin is warm and dry.   Neurological:      General: No focal deficit present.      Mental Status: She is alert and oriented to person, place, and time.   Psychiatric:         Mood and Affect: Mood normal.         Behavior: Behavior normal.     Problem List/Past Medical History  Past Medical History:   Diagnosis Date    Anxiety disorder, unspecified     Asymptomatic varicose veins of unspecified lower extremity     Diverticulitis     Endometriosis, unspecified     HTN (hypertension)     Other specified noninfective gastroenteritis and colitis        Procedure/Surgical History  Past Surgical History:   Procedure Laterality Date    hemorroid operation      SINUS SURGERY      TOTAL ABDOMINAL HYSTERECTOMY         Medications  Current Outpatient Medications on File Prior to Visit   Medication Sig Dispense Refill    albuterol (PROAIR HFA) 90 mcg/actuation inhaler Inhale 2 puffs into the lungs every 6 (six) hours as needed for Wheezing. Rescue 18 g 0    ALPRAZolam (XANAX) 0.5 MG tablet Take 0.5 mg by mouth nightly as needed.      atenoloL (TENORMIN) 25 MG tablet   See Instructions, Take 1 tablet by mouth once daily, # 90  tab(s), 3 Refill(s), Pharmacy: [facility], 164, cm, Height/Length Dosing, 08/15/21 8:13:00 CDT, 65.7, kg, Weight Dosing, 08/15/21 8:13:00 CDT      budesonide-formoterol 80-4.5 mcg (SYMBICORT) 80-4.5 mcg/actuation HFAA Inhale into the lungs.      CIPRO 500 mg tablet Take 500 mg by mouth 2 (two) times daily.      estradiol 0.05 mg/24 hr td ptwk 0.05 mg/24 hr PTWK       gabapentin (NEURONTIN) 300 MG capsule Take 300 mg by mouth 2 (two) times daily.      hyoscyamine (LEVSIN/SL) 0.125 mg Subl       methylPREDNISolone (MEDROL DOSEPACK) 4 mg tablet use as directed 1 each 0    mupirocin (BACTROBAN) 2 % ointment SMARTSIG:Sparingly Topical 3 Times Daily      NIFEDIPINE, BULK, MISC APPLY UP TO 1 GRAM TO THE INTERNAL AND EXTERNAL ANUS TWICE DAILY AS DIRECTED.      potassium chloride (MICRO-K) 10 MEQ CpSR Take 1 capsule (10 mEq total) by mouth once daily. 90 capsule 3    RECTICARE 5 % Crea SMARTSIG:Sparingly T-DERMAL Every 3-4 Hours PRN      rizatriptan (MAXALT) 10 MG tablet       valACYclovir (VALTREX) 1000 MG tablet Take 1,000 mg by mouth 2 (two) times daily.      [DISCONTINUED] butalbital-acetaminophen-caff -40 mg Cap Take by mouth.      [DISCONTINUED] EScitalopram oxalate (LEXAPRO) 20 MG tablet       [DISCONTINUED] lisinopriL-hydrochlorothiazide (PRINZIDE,ZESTORETIC) 20-12.5 mg per tablet Take 1 tablet by mouth once daily. 90 tablet 0    amitriptyline (ELAVIL) 100 MG tablet Take 1 tablet (100 mg total) by mouth every evening. 30 tablet 5    predniSONE (DELTASONE) 20 MG tablet Take 40 mg by mouth.       No current facility-administered medications on file prior to visit.       Allegies  Review of patient's allergies indicates:   Allergen Reactions    Aspirin Shortness Of Breath     Other reaction(s): severe asthma attack    Ibuprofen      Other reaction(s): severe asthma attack    Penicillins      Other reaction(s): severe asthma attack    Codeine Anxiety     Other reaction(s): makes her anxious        Social  History  Social History     Socioeconomic History    Marital status:    Tobacco Use    Smoking status: Every Day     Packs/day: 1.00     Types: Cigarettes    Smokeless tobacco: Never   Substance and Sexual Activity    Alcohol use: Yes    Drug use: Never    Sexual activity: Yes       Family History  History reviewed. No pertinent family history.      Immunizations  Immunization History   Administered Date(s) Administered    COVID-19, MRNA, LN-S, PF (Pfizer) (Purple Cap) 08/27/2021, 09/17/2021    Influenza - Quadrivalent - PF *Preferred* (6 months and older) 10/17/2018, 09/29/2020    Influenza - Trivalent - PF (ADULT) 11/20/2019       Health Maintenance  Health Maintenance   Topic Date Due    Hepatitis C Screening  Never done    TETANUS VACCINE  Never done    Mammogram  12/02/2022    Lipid Panel  08/29/2027        Assessment / Plan  Problem List Items Addressed This Visit          Renal/    Cellulitis of right breast    Current Assessment & Plan     Recent evaluation including ultrasound of her right breast performed within the past 3 days showed no signs concerning for abscess however inadequate improvement noted with 3 of 6 day course of Medrol Dosepak concern for developing cellulitis, will initiate clindamycin for 10 day course and follow up her previously scheduled appointment with breast specialist to completion of antibiotic regimen          Other Visit Diagnoses       Wellness examination    -  Primary    Hypertension, unspecified type        Hyperlipidemia, unspecified hyperlipidemia type              RTC 3 months    Librado Menon

## 2022-10-10 NOTE — ASSESSMENT & PLAN NOTE
Recent evaluation including ultrasound of her right breast performed within the past 3 days showed no signs concerning for abscess however inadequate improvement noted with 3 of 6 day course of Medrol Dosepak concern for developing cellulitis, will initiate clindamycin for 10 day course and follow up her previously scheduled appointment with breast specialist to completion of antibiotic regimen

## 2022-10-19 ENCOUNTER — PATIENT MESSAGE (OUTPATIENT)
Dept: PRIMARY CARE CLINIC | Facility: CLINIC | Age: 47
End: 2022-10-19
Payer: COMMERCIAL

## 2022-12-01 LAB — BCS RECOMMENDATION EXT: NORMAL

## 2022-12-09 ENCOUNTER — DOCUMENTATION ONLY (OUTPATIENT)
Dept: PRIMARY CARE CLINIC | Facility: CLINIC | Age: 47
End: 2022-12-09
Payer: COMMERCIAL

## 2022-12-22 ENCOUNTER — TELEPHONE (OUTPATIENT)
Dept: PRIMARY CARE CLINIC | Facility: CLINIC | Age: 47
End: 2022-12-22
Payer: COMMERCIAL

## 2022-12-22 ENCOUNTER — PATIENT MESSAGE (OUTPATIENT)
Dept: PRIMARY CARE CLINIC | Facility: CLINIC | Age: 47
End: 2022-12-22
Payer: COMMERCIAL

## 2022-12-22 NOTE — TELEPHONE ENCOUNTER
I just want to make sure my estrogen level will be tested with my blood work.  I would also like my DHEA to be tested as it was low when i did bloodwork ordered by my OBGYN (Dr. Sujit Menon) and he put me on 25 mg of DHEA everyday.  I go do my bloodwork 1/13/2023 at the [facility] across from [facility].  Thanks,  Marilynn Robles  1975  [phone]

## 2022-12-27 DIAGNOSIS — Z79.890 HORMONE REPLACEMENT THERAPY: Primary | ICD-10-CM

## 2023-01-13 ENCOUNTER — LAB VISIT (OUTPATIENT)
Dept: LAB | Facility: HOSPITAL | Age: 48
End: 2023-01-13
Attending: FAMILY MEDICINE
Payer: COMMERCIAL

## 2023-01-13 DIAGNOSIS — Z00.00 WELLNESS EXAMINATION: ICD-10-CM

## 2023-01-13 DIAGNOSIS — Z79.890 HORMONE REPLACEMENT THERAPY: ICD-10-CM

## 2023-01-13 DIAGNOSIS — E78.5 HYPERLIPIDEMIA, UNSPECIFIED HYPERLIPIDEMIA TYPE: ICD-10-CM

## 2023-01-13 LAB
ALBUMIN SERPL-MCNC: 4 G/DL (ref 3.5–5)
ALBUMIN/GLOB SERPL: 1.3 RATIO (ref 1.1–2)
ALP SERPL-CCNC: 133 UNIT/L (ref 40–150)
ALT SERPL-CCNC: 9 UNIT/L (ref 0–55)
APPEARANCE UR: CLEAR
AST SERPL-CCNC: 13 UNIT/L (ref 5–34)
BACTERIA #/AREA URNS AUTO: NORMAL /HPF
BASOPHILS # BLD AUTO: 0.03 X10(3)/MCL (ref 0–0.2)
BASOPHILS NFR BLD AUTO: 0.3 %
BILIRUB UR QL STRIP.AUTO: NEGATIVE MG/DL
BILIRUBIN DIRECT+TOT PNL SERPL-MCNC: 0.5 MG/DL
BUN SERPL-MCNC: 7.3 MG/DL (ref 7–18.7)
CALCIUM SERPL-MCNC: 10.1 MG/DL (ref 8.4–10.2)
CHLORIDE SERPL-SCNC: 101 MMOL/L (ref 98–107)
CHOLEST SERPL-MCNC: 212 MG/DL
CHOLEST/HDLC SERPL: 6 {RATIO} (ref 0–5)
CO2 SERPL-SCNC: 27 MMOL/L (ref 22–29)
COLOR UR AUTO: YELLOW
CREAT SERPL-MCNC: 0.87 MG/DL (ref 0.55–1.02)
EOSINOPHIL # BLD AUTO: 0.1 X10(3)/MCL (ref 0–0.9)
EOSINOPHIL NFR BLD AUTO: 1 %
ERYTHROCYTE [DISTWIDTH] IN BLOOD BY AUTOMATED COUNT: 12.9 % (ref 11.5–17)
ESTRADIOL SERPL HS-MCNC: <24 PG/ML
GFR SERPLBLD CREATININE-BSD FMLA CKD-EPI: >60 MLS/MIN/1.73/M2
GLOBULIN SER-MCNC: 3 GM/DL (ref 2.4–3.5)
GLUCOSE SERPL-MCNC: 92 MG/DL (ref 74–100)
GLUCOSE UR QL STRIP.AUTO: NEGATIVE MG/DL
HCT VFR BLD AUTO: 44.8 % (ref 37–47)
HDLC SERPL-MCNC: 38 MG/DL (ref 35–60)
HGB BLD-MCNC: 15 GM/DL (ref 12–16)
IMM GRANULOCYTES # BLD AUTO: 0.04 X10(3)/MCL (ref 0–0.04)
IMM GRANULOCYTES NFR BLD AUTO: 0.4 %
KETONES UR QL STRIP.AUTO: NEGATIVE MG/DL
LDLC SERPL CALC-MCNC: 146 MG/DL (ref 50–140)
LEUKOCYTE ESTERASE UR QL STRIP.AUTO: NEGATIVE UNIT/L
LYMPHOCYTES # BLD AUTO: 2.66 X10(3)/MCL (ref 0.6–4.6)
LYMPHOCYTES NFR BLD AUTO: 26.1 %
MCH RBC QN AUTO: 33.5 PG
MCHC RBC AUTO-ENTMCNC: 33.5 MG/DL (ref 33–36)
MCV RBC AUTO: 100 FL (ref 80–94)
MONOCYTES # BLD AUTO: 0.65 X10(3)/MCL (ref 0.1–1.3)
MONOCYTES NFR BLD AUTO: 6.4 %
NEUTROPHILS # BLD AUTO: 6.72 X10(3)/MCL (ref 2.1–9.2)
NEUTROPHILS NFR BLD AUTO: 65.8 %
NITRITE UR QL STRIP.AUTO: NEGATIVE
NRBC BLD AUTO-RTO: 0 %
PH UR STRIP.AUTO: 6.5 [PH]
PLATELET # BLD AUTO: 267 X10(3)/MCL (ref 130–400)
PMV BLD AUTO: 10.6 FL (ref 7.4–10.4)
POTASSIUM SERPL-SCNC: 3.9 MMOL/L (ref 3.5–5.1)
PROT SERPL-MCNC: 7 GM/DL (ref 6.4–8.3)
PROT UR QL STRIP.AUTO: NEGATIVE MG/DL
RBC # BLD AUTO: 4.48 X10(6)/MCL (ref 4.2–5.4)
RBC #/AREA URNS AUTO: <5 /HPF
RBC UR QL AUTO: NEGATIVE UNIT/L
SODIUM SERPL-SCNC: 139 MMOL/L (ref 136–145)
SP GR UR STRIP.AUTO: 1.01 (ref 1–1.03)
SQUAMOUS #/AREA URNS AUTO: <5 /HPF
TRIGL SERPL-MCNC: 139 MG/DL (ref 37–140)
UROBILINOGEN UR STRIP-ACNC: 0.2 MG/DL
VLDLC SERPL CALC-MCNC: 28 MG/DL
WBC # SPEC AUTO: 10.2 X10(3)/MCL (ref 4.5–11.5)
WBC #/AREA URNS AUTO: <5 /HPF

## 2023-01-13 PROCEDURE — 82627 DEHYDROEPIANDROSTERONE: CPT

## 2023-01-13 PROCEDURE — 85025 COMPLETE CBC W/AUTO DIFF WBC: CPT

## 2023-01-13 PROCEDURE — 81001 URINALYSIS AUTO W/SCOPE: CPT

## 2023-01-13 PROCEDURE — 80053 COMPREHEN METABOLIC PANEL: CPT

## 2023-01-13 PROCEDURE — 36415 COLL VENOUS BLD VENIPUNCTURE: CPT

## 2023-01-13 PROCEDURE — 80061 LIPID PANEL: CPT

## 2023-01-13 PROCEDURE — 82670 ASSAY OF TOTAL ESTRADIOL: CPT

## 2023-01-14 LAB — DHEA-S SERPL-MCNC: 365 MCG/DL (ref 27–240)

## 2023-01-19 ENCOUNTER — OFFICE VISIT (OUTPATIENT)
Dept: PRIMARY CARE CLINIC | Facility: CLINIC | Age: 48
End: 2023-01-19
Payer: COMMERCIAL

## 2023-01-19 VITALS
SYSTOLIC BLOOD PRESSURE: 101 MMHG | HEART RATE: 78 BPM | WEIGHT: 173.38 LBS | BODY MASS INDEX: 29.76 KG/M2 | OXYGEN SATURATION: 98 % | DIASTOLIC BLOOD PRESSURE: 65 MMHG

## 2023-01-19 DIAGNOSIS — I10 HYPERTENSION, UNSPECIFIED TYPE: Primary | ICD-10-CM

## 2023-01-19 DIAGNOSIS — M54.6 ACUTE LEFT-SIDED THORACIC BACK PAIN: ICD-10-CM

## 2023-01-19 DIAGNOSIS — R79.89 ELEVATED DHEA: ICD-10-CM

## 2023-01-19 DIAGNOSIS — F41.1 GAD (GENERALIZED ANXIETY DISORDER): ICD-10-CM

## 2023-01-19 PROCEDURE — 1159F MED LIST DOCD IN RCRD: CPT | Mod: CPTII,,, | Performed by: FAMILY MEDICINE

## 2023-01-19 PROCEDURE — 3074F PR MOST RECENT SYSTOLIC BLOOD PRESSURE < 130 MM HG: ICD-10-PCS | Mod: CPTII,,, | Performed by: FAMILY MEDICINE

## 2023-01-19 PROCEDURE — 4010F PR ACE/ARB THEARPY RXD/TAKEN: ICD-10-PCS | Mod: CPTII,,, | Performed by: FAMILY MEDICINE

## 2023-01-19 PROCEDURE — 99214 OFFICE O/P EST MOD 30 MIN: CPT | Mod: ,,, | Performed by: FAMILY MEDICINE

## 2023-01-19 PROCEDURE — 1159F PR MEDICATION LIST DOCUMENTED IN MEDICAL RECORD: ICD-10-PCS | Mod: CPTII,,, | Performed by: FAMILY MEDICINE

## 2023-01-19 PROCEDURE — 1160F RVW MEDS BY RX/DR IN RCRD: CPT | Mod: CPTII,,, | Performed by: FAMILY MEDICINE

## 2023-01-19 PROCEDURE — 3008F BODY MASS INDEX DOCD: CPT | Mod: CPTII,,, | Performed by: FAMILY MEDICINE

## 2023-01-19 PROCEDURE — 4010F ACE/ARB THERAPY RXD/TAKEN: CPT | Mod: CPTII,,, | Performed by: FAMILY MEDICINE

## 2023-01-19 PROCEDURE — 1160F PR REVIEW ALL MEDS BY PRESCRIBER/CLIN PHARMACIST DOCUMENTED: ICD-10-PCS | Mod: CPTII,,, | Performed by: FAMILY MEDICINE

## 2023-01-19 PROCEDURE — 3008F PR BODY MASS INDEX (BMI) DOCUMENTED: ICD-10-PCS | Mod: CPTII,,, | Performed by: FAMILY MEDICINE

## 2023-01-19 PROCEDURE — 3078F DIAST BP <80 MM HG: CPT | Mod: CPTII,,, | Performed by: FAMILY MEDICINE

## 2023-01-19 PROCEDURE — 3074F SYST BP LT 130 MM HG: CPT | Mod: CPTII,,, | Performed by: FAMILY MEDICINE

## 2023-01-19 PROCEDURE — 3078F PR MOST RECENT DIASTOLIC BLOOD PRESSURE < 80 MM HG: ICD-10-PCS | Mod: CPTII,,, | Performed by: FAMILY MEDICINE

## 2023-01-19 PROCEDURE — 99214 PR OFFICE/OUTPT VISIT, EST, LEVL IV, 30-39 MIN: ICD-10-PCS | Mod: ,,, | Performed by: FAMILY MEDICINE

## 2023-01-19 RX ORDER — CYCLOBENZAPRINE HCL 5 MG
5 TABLET ORAL 3 TIMES DAILY PRN
Qty: 30 TABLET | Refills: 0 | Status: SHIPPED | OUTPATIENT
Start: 2023-01-19 | End: 2023-01-29

## 2023-01-19 RX ORDER — GABAPENTIN 300 MG/1
300 CAPSULE ORAL 2 TIMES DAILY
Qty: 60 CAPSULE | Refills: 5 | Status: SHIPPED | OUTPATIENT
Start: 2023-01-19 | End: 2023-02-14 | Stop reason: SDUPTHER

## 2023-01-19 NOTE — PROGRESS NOTES
Chief Complaint  Chief Complaint   Patient presents with    Follow-up     With labs       History of Present Illness  Patient presents to clinic for a routine follow-up with lab review with CBC and CMP with no overly concerning findings with FLP with LDL of 146 similar to previous check with estradiol levels less than 24 which is similar to previous check 4 months ago patient with prior hysterectomy with ovaries still present however had been on estradiol therapy until approximately 6 months ago when she began developing issues with her breasts which led to cellulitis and vasculitis with workup revealing no signs concerning for breast cancer and since that time has had intermittent issues with low energy and struggled with hot flashes for several years but finds that mood is fairly stable with Lexapro which he has been taking for several years.  DHEA is elevated at 365 taking supplement once daily.      PMH:  Hypertension-atenolol 25 mg daily, lisinopril-HCTZ, previously on amlodipine  Tobacco abuse-21 pack year smoking history  Anxiety-Lexapro 20 mg daily, Xanax 0.5 mg 3 times daily as needed, previously Elavil  Multiple shingles outbreaks with post herpetic neuralgia-started in late 30s, last about 2018, gabapentin 300 mg 3 times daily as needed, previously Valtrex 500 mg daily  Osteopenia    Screening:  CT calcium score 06/28/2018, score 0  DEXA-04/2021, osteopenia    Review of Systems  Review of Systems   Constitutional:  Negative for chills, fatigue and fever.   HENT:  Negative for congestion and sore throat.    Eyes:  Negative for redness and visual disturbance.   Respiratory:  Negative for cough, shortness of breath and wheezing.    Cardiovascular:  Negative for chest pain, palpitations and leg swelling.   Gastrointestinal:  Negative for abdominal pain, nausea and vomiting.        Painful hemorrhoid   Musculoskeletal:  Positive for myalgias. Negative for arthralgias and neck pain.        Breast  pain/redness/swelling   Skin:  Negative for pallor and rash.   Neurological:  Negative for dizziness and headaches.   Psychiatric/Behavioral:  Negative for agitation and dysphoric mood.      Physical Exam    Physical Exam  Constitutional:       Appearance: Normal appearance.   HENT:      Head: Normocephalic and atraumatic.   Eyes:      General: No scleral icterus.     Conjunctiva/sclera: Conjunctivae normal.   Cardiovascular:      Rate and Rhythm: Normal rate and regular rhythm.   Pulmonary:      Effort: Pulmonary effort is normal.      Breath sounds: Normal breath sounds.   Skin:     General: Skin is warm and dry.   Neurological:      General: No focal deficit present.      Mental Status: She is alert and oriented to person, place, and time.   Psychiatric:         Mood and Affect: Mood normal.         Behavior: Behavior normal.     Problem List/Past Medical History  Past Medical History:   Diagnosis Date    Anxiety disorder, unspecified     Asymptomatic varicose veins of unspecified lower extremity     Diverticulitis     Endometriosis, unspecified     HTN (hypertension)     Other specified noninfective gastroenteritis and colitis        Procedure/Surgical History  Past Surgical History:   Procedure Laterality Date    BREAST BIOPSY Right     hemorroid operation      SINUS SURGERY      TOTAL ABDOMINAL HYSTERECTOMY         Medications  Current Outpatient Medications on File Prior to Visit   Medication Sig Dispense Refill    albuterol (PROAIR HFA) 90 mcg/actuation inhaler Inhale 2 puffs into the lungs every 6 (six) hours as needed for Wheezing. Rescue 18 g 0    ALPRAZolam (XANAX) 0.5 MG tablet TAKE 1 TABLET BY MOUTH ONCE DAILY AT BEDTIME AS NEEDED FOR ANXIETY 30 tablet 0    atenoloL (TENORMIN) 25 MG tablet   See Instructions, Take 1 tablet by mouth once daily, # 90 tab(s), 3 Refill(s), Pharmacy: [facility], 164, cm, Height/Length Dosing, 08/15/21 8:13:00 CDT, 65.7, kg, Weight Dosing, 08/15/21 8:13:00 CDT       budesonide-formoterol 80-4.5 mcg (SYMBICORT) 80-4.5 mcg/actuation HFAA Inhale into the lungs.      butalbital-acetaminophen-caff -40 mg Cap Take 1 capsule by mouth 3 (three) times daily. PRN Headache 30 capsule 5    EScitalopram oxalate (LEXAPRO) 20 MG tablet Take 1 tablet (20 mg total) by mouth every evening. 90 tablet 3    lisinopriL-hydrochlorothiazide (PRINZIDE,ZESTORETIC) 20-12.5 mg per tablet Take 1 tablet by mouth once daily. 90 tablet 3    NIFEDIPINE, BULK, MISC APPLY UP TO 1 GRAM TO THE INTERNAL AND EXTERNAL ANUS TWICE DAILY AS DIRECTED.      potassium chloride (MICRO-K) 10 MEQ CpSR Take 1 capsule (10 mEq total) by mouth once daily. 90 capsule 3    valACYclovir (VALTREX) 1000 MG tablet Take 1,000 mg by mouth 2 (two) times daily.      [DISCONTINUED] amitriptyline (ELAVIL) 100 MG tablet Take 1 tablet (100 mg total) by mouth every evening. 30 tablet 5    [DISCONTINUED] CIPRO 500 mg tablet Take 500 mg by mouth 2 (two) times daily.      [DISCONTINUED] clindamycin (CLEOCIN) 300 MG capsule Take 1 capsule (300 mg total) by mouth every 8 (eight) hours. 30 capsule 0    [DISCONTINUED] estradiol 0.05 mg/24 hr td ptwk 0.05 mg/24 hr PTWK       [DISCONTINUED] gabapentin (NEURONTIN) 300 MG capsule Take 300 mg by mouth 2 (two) times daily.      [DISCONTINUED] hyoscyamine (LEVSIN/SL) 0.125 mg Subl       [DISCONTINUED] methylPREDNISolone (MEDROL DOSEPACK) 4 mg tablet use as directed 1 each 0    [DISCONTINUED] mupirocin (BACTROBAN) 2 % ointment SMARTSIG:Sparingly Topical 3 Times Daily      [DISCONTINUED] predniSONE (DELTASONE) 20 MG tablet Take 40 mg by mouth.      [DISCONTINUED] RECTICARE 5 % Crea SMARTSIG:Sparingly T-DERMAL Every 3-4 Hours PRN      [DISCONTINUED] rizatriptan (MAXALT) 10 MG tablet        No current facility-administered medications on file prior to visit.       Allegies  Review of patient's allergies indicates:   Allergen Reactions    Aspirin Shortness Of Breath     Other reaction(s): severe asthma  attack    Ibuprofen      Other reaction(s): severe asthma attack    Penicillins      Other reaction(s): severe asthma attack    Codeine Anxiety     Other reaction(s): makes her anxious        Social History  Social History     Socioeconomic History    Marital status:    Tobacco Use    Smoking status: Every Day     Packs/day: 1.00     Types: Cigarettes    Smokeless tobacco: Never   Substance and Sexual Activity    Alcohol use: Yes    Drug use: Never    Sexual activity: Yes       Family History  History reviewed. No pertinent family history.      Immunizations  Immunization History   Administered Date(s) Administered    COVID-19, MRNA, LN-S, PF (Pfizer) (Purple Cap) 08/27/2021, 09/17/2021    Influenza - Quadrivalent - PF *Preferred* (6 months and older) 10/17/2018, 09/29/2020    Influenza - Trivalent - PF (ADULT) 11/20/2019       Health Maintenance  Health Maintenance   Topic Date Due    Hepatitis C Screening  Never done    TETANUS VACCINE  Never done    Mammogram  12/01/2023    Lipid Panel  01/13/2028        Assessment / Plan  Problem List Items Addressed This Visit          Psychiatric    DIGNA (generalized anxiety disorder)    Overview     Recommend CBT and mindfulness therapy with biofeedback techniques and deep breathing exercises, continue current medication regimen            Cardiac/Vascular    Hypertension - Primary    Overview     Blood pressure goal <140/90 (<130/80 if otherwise noted), recommend DASH diet, record BP at home daily and bring log to next office visit to assure that home cuff is calibrated at minimum every 12 months, continue current medication regimen.            Endocrine    Elevated DHEA    Overview     Deescalate DHEA supplementation to 50%            Orthopedic    Thoracic back pain    Overview     Warm compresses with stretching exercises recommended with over-the-counter NSAIDs with adequate hydration and Flexeril as needed        RTC 6 months    Librado Menon       Physical  Exam

## 2023-01-25 ENCOUNTER — PATIENT MESSAGE (OUTPATIENT)
Dept: PRIMARY CARE CLINIC | Facility: CLINIC | Age: 48
End: 2023-01-25
Payer: COMMERCIAL

## 2023-02-14 ENCOUNTER — TELEPHONE (OUTPATIENT)
Dept: PRIMARY CARE CLINIC | Facility: CLINIC | Age: 48
End: 2023-02-14
Payer: COMMERCIAL

## 2023-02-14 DIAGNOSIS — E87.6 LOW POTASSIUM SYNDROME: Primary | ICD-10-CM

## 2023-02-14 DIAGNOSIS — F41.1 GAD (GENERALIZED ANXIETY DISORDER): Primary | ICD-10-CM

## 2023-02-14 DIAGNOSIS — I10 HYPERTENSION, UNSPECIFIED TYPE: ICD-10-CM

## 2023-02-14 RX ORDER — ALPRAZOLAM 0.5 MG/1
TABLET ORAL
Qty: 30 TABLET | Refills: 0 | OUTPATIENT
Start: 2023-02-14

## 2023-02-14 RX ORDER — ESCITALOPRAM OXALATE 20 MG/1
20 TABLET ORAL NIGHTLY
Qty: 90 TABLET | Refills: 3 | OUTPATIENT
Start: 2023-02-14

## 2023-02-14 RX ORDER — POTASSIUM CHLORIDE 750 MG/1
10 CAPSULE, EXTENDED RELEASE ORAL DAILY
Qty: 90 CAPSULE | Refills: 3 | Status: SHIPPED | OUTPATIENT
Start: 2023-02-14 | End: 2023-02-14 | Stop reason: SDUPTHER

## 2023-02-14 RX ORDER — ATENOLOL 25 MG/1
TABLET ORAL
Qty: 30 TABLET | Refills: 5 | Status: SHIPPED | OUTPATIENT
Start: 2023-02-14 | End: 2023-02-14 | Stop reason: SDUPTHER

## 2023-02-14 RX ORDER — LISINOPRIL AND HYDROCHLOROTHIAZIDE 12.5; 2 MG/1; MG/1
1 TABLET ORAL DAILY
Qty: 90 TABLET | Refills: 3 | Status: SHIPPED | OUTPATIENT
Start: 2023-02-14 | End: 2023-02-14 | Stop reason: SDUPTHER

## 2023-02-14 NOTE — TELEPHONE ENCOUNTER
----- Message from Yessy Ayala sent at 2/14/2023  9:40 AM CST -----  Regarding: refill  Type:  Pharmacy Calling to Clarify an RX    Name of Caller:Meghana  Pharmacy Name: [facility]  Prescription Name:EScitalopram oxalate (LEXAPRO) 20 MG tablet  What do they need to clarify?:refill  Prescription Name:potassium chloride (MICRO-K) 10 MEQ CpSR  What do they need toclarify:refill  Prescription Name: ALPRAZolam (XANAX) 0.5 MG tablet  What do they need to clarify?:refill   Prescription Name:atenoloL (TENORMIN) 25 MG tablet  What do they need to clarify?:refill  Prescription Name:lisinopriL-hydrochlorothiazide (PRINZIDE,ZESTORETIC) 20-12.5 mg per tablet  What do they need to clarify?:refill  Best Call Back Number:[phone]  Additional Information: fax # [phone]

## 2023-03-13 ENCOUNTER — LAB REQUISITION (OUTPATIENT)
Dept: LAB | Facility: HOSPITAL | Age: 48
End: 2023-03-13
Payer: COMMERCIAL

## 2023-03-13 DIAGNOSIS — F17.200 NICOTINE DEPENDENCE, UNSPECIFIED, UNCOMPLICATED: ICD-10-CM

## 2023-03-13 DIAGNOSIS — R19.7 DIARRHEA, UNSPECIFIED: ICD-10-CM

## 2023-03-13 PROCEDURE — 82653 EL-1 FECAL QUANTITATIVE: CPT | Mod: 90 | Performed by: NURSE PRACTITIONER

## 2023-03-15 LAB — ELASTASE PANC STL-MCNT: >500 MCG/G

## 2023-05-16 ENCOUNTER — LAB REQUISITION (OUTPATIENT)
Dept: LAB | Facility: HOSPITAL | Age: 48
End: 2023-05-16
Payer: COMMERCIAL

## 2023-05-16 DIAGNOSIS — L30.9 DERMATITIS, UNSPECIFIED: ICD-10-CM

## 2023-05-16 PROCEDURE — 87070 CULTURE OTHR SPECIMN AEROBIC: CPT | Performed by: PHYSICIAN ASSISTANT

## 2023-05-19 LAB — BACTERIA SPEC CULT: NO GROWTH

## 2023-06-28 ENCOUNTER — LAB VISIT (OUTPATIENT)
Dept: LAB | Facility: HOSPITAL | Age: 48
End: 2023-06-28
Attending: PHYSICIAN ASSISTANT
Payer: COMMERCIAL

## 2023-06-28 ENCOUNTER — PATIENT MESSAGE (OUTPATIENT)
Dept: PRIMARY CARE CLINIC | Facility: CLINIC | Age: 48
End: 2023-06-28
Payer: COMMERCIAL

## 2023-06-28 DIAGNOSIS — Z87.891 PERSONAL HISTORY OF NICOTINE DEPENDENCE: Primary | ICD-10-CM

## 2023-06-28 DIAGNOSIS — L98.499 CHRONIC ULCER OF SKIN: Primary | ICD-10-CM

## 2023-06-28 LAB
(HCYS)2 SERPL-MCNC: 12 UMOL/L (ref 5.1–15.4)
TT IMM BOVINE THROMBIN PPP: 15 SECONDS (ref 0–22)

## 2023-06-28 PROCEDURE — 85300 ANTITHROMBIN III ACTIVITY: CPT

## 2023-06-28 PROCEDURE — 85613 RUSSELL VIPER VENOM DILUTED: CPT

## 2023-06-28 PROCEDURE — 36415 COLL VENOUS BLD VENIPUNCTURE: CPT

## 2023-06-28 PROCEDURE — 85670 THROMBIN TIME PLASMA: CPT

## 2023-06-28 PROCEDURE — 86225 DNA ANTIBODY NATIVE: CPT

## 2023-06-28 PROCEDURE — 85306 CLOT INHIBIT PROT S FREE: CPT

## 2023-06-28 PROCEDURE — 85302 CLOT INHIBIT PROT C ANTIGEN: CPT

## 2023-06-28 PROCEDURE — 81241 F5 GENE: CPT

## 2023-06-28 PROCEDURE — 83090 ASSAY OF HOMOCYSTEINE: CPT

## 2023-06-28 RX ORDER — IBUPROFEN 200 MG
1 TABLET ORAL DAILY
Qty: 30 PATCH | Refills: 1 | Status: SHIPPED | OUTPATIENT
Start: 2023-06-28

## 2023-06-29 LAB
AT III ACT/NOR PPP CHRO: 110 % (ref 80–130)
PROT S FREE AG ACT/NOR PPP IA: 136 % (ref 50–160)

## 2023-06-30 LAB
ANTINUCLEAR ANTIBODY SCREEN (OHS): NEGATIVE
DSDNA AB QUANT (OHS): 2.1 IU/ML

## 2023-07-03 LAB
COAGULATION SPECIALIST REVIEW: NORMAL
F5 P.R506Q BLD/T QL: NEGATIVE
PROT C AG ACT/NOR PPP IA: 111 % (ref 72–160)
PROVIDER SIGNING NAME: NORMAL

## 2023-07-06 LAB
DRVV CONF RATIO (OHS): 1.03
DRVV NORM RATIO (OHS): 1.03 (ref 0–1.19)
DRVV SCR RATIO (OHS): 1.06
L.A. PATH INTERP (OHS): NORMAL
LA ST CALC (OHS): 1.6 SECONDS (ref 0–7.9)

## 2023-07-18 PROBLEM — N61.0 CELLULITIS OF RIGHT BREAST: Status: RESOLVED | Noted: 2022-10-10 | Resolved: 2023-07-18

## 2023-07-18 PROBLEM — U07.1 COVID-19: Status: RESOLVED | Noted: 2022-09-07 | Resolved: 2023-07-18

## 2023-07-18 PROBLEM — E87.6 HYPOKALEMIA: Status: RESOLVED | Noted: 2022-09-07 | Resolved: 2023-07-18

## 2023-07-20 ENCOUNTER — OFFICE VISIT (OUTPATIENT)
Dept: PRIMARY CARE CLINIC | Facility: CLINIC | Age: 48
End: 2023-07-20
Payer: COMMERCIAL

## 2023-07-20 VITALS
BODY MASS INDEX: 28.72 KG/M2 | DIASTOLIC BLOOD PRESSURE: 59 MMHG | SYSTOLIC BLOOD PRESSURE: 96 MMHG | OXYGEN SATURATION: 96 % | WEIGHT: 167.31 LBS

## 2023-07-20 DIAGNOSIS — F17.200 NEEDS SMOKING CESSATION EDUCATION: ICD-10-CM

## 2023-07-20 DIAGNOSIS — I10 PRIMARY HYPERTENSION: ICD-10-CM

## 2023-07-20 DIAGNOSIS — Z00.00 WELLNESS EXAMINATION: Primary | ICD-10-CM

## 2023-07-20 DIAGNOSIS — G43.909 MIGRAINE WITHOUT STATUS MIGRAINOSUS, NOT INTRACTABLE, UNSPECIFIED MIGRAINE TYPE: ICD-10-CM

## 2023-07-20 DIAGNOSIS — F41.1 GAD (GENERALIZED ANXIETY DISORDER): ICD-10-CM

## 2023-07-20 DIAGNOSIS — Z87.891 PERSONAL HISTORY OF NICOTINE DEPENDENCE: ICD-10-CM

## 2023-07-20 PROCEDURE — 1159F PR MEDICATION LIST DOCUMENTED IN MEDICAL RECORD: ICD-10-PCS | Mod: CPTII,,, | Performed by: STUDENT IN AN ORGANIZED HEALTH CARE EDUCATION/TRAINING PROGRAM

## 2023-07-20 PROCEDURE — 99213 PR OFFICE/OUTPT VISIT, EST, LEVL III, 20-29 MIN: ICD-10-PCS | Mod: 25,,, | Performed by: STUDENT IN AN ORGANIZED HEALTH CARE EDUCATION/TRAINING PROGRAM

## 2023-07-20 PROCEDURE — 3008F BODY MASS INDEX DOCD: CPT | Mod: CPTII,,, | Performed by: STUDENT IN AN ORGANIZED HEALTH CARE EDUCATION/TRAINING PROGRAM

## 2023-07-20 PROCEDURE — 1159F MED LIST DOCD IN RCRD: CPT | Mod: CPTII,,, | Performed by: STUDENT IN AN ORGANIZED HEALTH CARE EDUCATION/TRAINING PROGRAM

## 2023-07-20 PROCEDURE — 1160F PR REVIEW ALL MEDS BY PRESCRIBER/CLIN PHARMACIST DOCUMENTED: ICD-10-PCS | Mod: CPTII,,, | Performed by: STUDENT IN AN ORGANIZED HEALTH CARE EDUCATION/TRAINING PROGRAM

## 2023-07-20 PROCEDURE — 3008F PR BODY MASS INDEX (BMI) DOCUMENTED: ICD-10-PCS | Mod: CPTII,,, | Performed by: STUDENT IN AN ORGANIZED HEALTH CARE EDUCATION/TRAINING PROGRAM

## 2023-07-20 PROCEDURE — 3078F PR MOST RECENT DIASTOLIC BLOOD PRESSURE < 80 MM HG: ICD-10-PCS | Mod: CPTII,,, | Performed by: STUDENT IN AN ORGANIZED HEALTH CARE EDUCATION/TRAINING PROGRAM

## 2023-07-20 PROCEDURE — 99213 OFFICE O/P EST LOW 20 MIN: CPT | Mod: 25,,, | Performed by: STUDENT IN AN ORGANIZED HEALTH CARE EDUCATION/TRAINING PROGRAM

## 2023-07-20 PROCEDURE — 3074F SYST BP LT 130 MM HG: CPT | Mod: CPTII,,, | Performed by: STUDENT IN AN ORGANIZED HEALTH CARE EDUCATION/TRAINING PROGRAM

## 2023-07-20 PROCEDURE — 4010F PR ACE/ARB THEARPY RXD/TAKEN: ICD-10-PCS | Mod: CPTII,,, | Performed by: STUDENT IN AN ORGANIZED HEALTH CARE EDUCATION/TRAINING PROGRAM

## 2023-07-20 PROCEDURE — 1160F RVW MEDS BY RX/DR IN RCRD: CPT | Mod: CPTII,,, | Performed by: STUDENT IN AN ORGANIZED HEALTH CARE EDUCATION/TRAINING PROGRAM

## 2023-07-20 PROCEDURE — 4010F ACE/ARB THERAPY RXD/TAKEN: CPT | Mod: CPTII,,, | Performed by: STUDENT IN AN ORGANIZED HEALTH CARE EDUCATION/TRAINING PROGRAM

## 2023-07-20 PROCEDURE — 3078F DIAST BP <80 MM HG: CPT | Mod: CPTII,,, | Performed by: STUDENT IN AN ORGANIZED HEALTH CARE EDUCATION/TRAINING PROGRAM

## 2023-07-20 PROCEDURE — 99396 PR PREVENTIVE VISIT,EST,40-64: ICD-10-PCS | Mod: ,,, | Performed by: STUDENT IN AN ORGANIZED HEALTH CARE EDUCATION/TRAINING PROGRAM

## 2023-07-20 PROCEDURE — 99406 PR TOBACCO USE CESSATION INTERMEDIATE 3-10 MINUTES: ICD-10-PCS | Mod: ,,, | Performed by: STUDENT IN AN ORGANIZED HEALTH CARE EDUCATION/TRAINING PROGRAM

## 2023-07-20 PROCEDURE — 3074F PR MOST RECENT SYSTOLIC BLOOD PRESSURE < 130 MM HG: ICD-10-PCS | Mod: CPTII,,, | Performed by: STUDENT IN AN ORGANIZED HEALTH CARE EDUCATION/TRAINING PROGRAM

## 2023-07-20 PROCEDURE — 99406 BEHAV CHNG SMOKING 3-10 MIN: CPT | Mod: ,,, | Performed by: STUDENT IN AN ORGANIZED HEALTH CARE EDUCATION/TRAINING PROGRAM

## 2023-07-20 PROCEDURE — 99396 PREV VISIT EST AGE 40-64: CPT | Mod: ,,, | Performed by: STUDENT IN AN ORGANIZED HEALTH CARE EDUCATION/TRAINING PROGRAM

## 2023-07-20 RX ORDER — BUTALBITAL, ACETAMINOPHEN AND CAFFEINE 300; 40; 50 MG/1; MG/1; MG/1
1 CAPSULE ORAL 3 TIMES DAILY
Qty: 30 CAPSULE | Refills: 5 | Status: SHIPPED | OUTPATIENT
Start: 2023-07-20 | End: 2024-06-19

## 2023-07-20 RX ORDER — ESCITALOPRAM OXALATE 20 MG/1
20 TABLET ORAL NIGHTLY
Qty: 90 TABLET | Refills: 3 | Status: SHIPPED | OUTPATIENT
Start: 2023-07-20 | End: 2024-02-07 | Stop reason: SDUPTHER

## 2023-07-20 RX ORDER — ALPRAZOLAM 0.5 MG/1
0.5 TABLET ORAL NIGHTLY PRN
Qty: 30 TABLET | Refills: 5 | Status: SHIPPED | OUTPATIENT
Start: 2023-07-20

## 2023-07-20 RX ORDER — BUPROPION HYDROCHLORIDE 150 MG/1
150 TABLET, EXTENDED RELEASE ORAL 2 TIMES DAILY
Qty: 60 TABLET | Refills: 11 | Status: SHIPPED | OUTPATIENT
Start: 2023-07-20 | End: 2024-07-19

## 2023-07-20 NOTE — PROGRESS NOTES
Date: 07/20/2023  Patient ID: [id number]   Chief Complaint: Annual Exam (Without labs)    HPI:   Marilynn Robles is a 48 y.o. female here today for an annual wellness visit. Also f/u with and gets labs with Dr. Campbell and Dr. Eagle Larios for R breast angiomatosis. Release from wound care [facility]. Also wanting help to quit smoking-slowed down but patches difficult and dont stay on. Overall she feels well. No other complaints today. Patient has not taken Prinzide since BP low lately. Also needs med refills    Diet: average  Activity level: not very active    Patient Active Problem List   Diagnosis    Wellness examination    DIGNA (generalized anxiety disorder)    Hypertension    Thoracic back pain    Elevated DHEA    Personal history of nicotine dependence     Outpatient Medications Marked as Taking for the 7/20/23 encounter (Office Visit) with Jada Escobedo MD   Medication Sig Dispense Refill    albuterol (PROVENTIL/VENTOLIN HFA) 90 mcg/actuation inhaler USE 2 INHALATIONS BY MOUTH EVERY 6 HOURS AS NEEDED FOR WHEEZING 17 g 6    atenoloL (TENORMIN) 25 MG tablet See Instructions, Take 1 tablet by mouth once daily, # 90 tab(s), 3 Refill(s), Pharmacy: [facility], 164, cm, Height/Length Dosing, 08/15/21 8:13:00 CDT, 65.7, kg, Weight Dosing, 08/15/21 8:13:00 CDT 30 tablet 5    budesonide-formoterol 80-4.5 mcg (SYMBICORT) 80-4.5 mcg/actuation HFAA Inhale into the lungs.      gabapentin (NEURONTIN) 300 MG capsule Take 1 capsule (300 mg total) by mouth 2 (two) times daily. 60 capsule 5    lisinopriL-hydrochlorothiazide (PRINZIDE,ZESTORETIC) 20-12.5 mg per tablet Take 1 tablet by mouth once daily. 90 tablet 3    nicotine (NICODERM CQ) 21 mg/24 hr Place 1 patch onto the skin once daily. 30 patch 1    potassium chloride (MICRO-K) 10 MEQ CpSR Take 1 capsule (10 mEq total) by mouth once daily. 90 capsule 3    valACYclovir (VALTREX) 1000 MG tablet Take 1,000 mg by mouth 2 (two) times daily.      [DISCONTINUED]  ALPRAZolam (XANAX) 0.5 MG tablet Take 1 tablet (0.5 mg total) by mouth nightly as needed for Anxiety. 30 tablet 5    [DISCONTINUED] butalbital-acetaminophen-caff -40 mg Cap Take 1 capsule by mouth 3 (three) times daily. PRN Headache 30 capsule 5    [DISCONTINUED] EScitalopram oxalate (LEXAPRO) 20 MG tablet Take 1 tablet (20 mg total) by mouth every evening. 90 tablet 3     Past Medical History:   Diagnosis Date    Anxiety disorder, unspecified     Asymptomatic varicose veins of unspecified lower extremity     Cellulitis of right breast 10/10/2022    COVID-19 09/07/2022    Paxlovid for 5 days initiated today, Tessalon as needed for cough, ER precautions for respiratory distress despite current management with home breathing treatments as needed every 4 hours for persistent wheezing.    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 residual right head pain     Past Surgical History:   Procedure Laterality Date    BREAST BIOPSY Right     hemorroid operation      SINUS SURGERY      TOTAL ABDOMINAL HYSTERECTOMY       Review of patient's allergies indicates:   Allergen Reactions    Aspirin Shortness Of Breath     Other reaction(s): severe asthma attack    Ibuprofen      Other reaction(s): severe asthma attack    Penicillins      Other reaction(s): severe asthma attack    Codeine Anxiety     Other reaction(s): makes her anxious     History reviewed. No pertinent family history.   Social History     Socioeconomic History    Marital status:    Tobacco Use    Smoking status: Every Day     Packs/day: 1.00     Types: Cigarettes    Smokeless tobacco: Never   Substance and Sexual Activity    Alcohol use: Yes    Drug use: Never    Sexual activity: Yes     Patient Care Team:  Jada  MD Gregg as PCP - General (Family Medicine)  Fab Bedoya MD as Consulting Physician (Gastroenterology)  Allan Pearce MD (Plastic Surgery)  Eagle Larios DO (Surgery)  Isaiah Brooks MD as Consulting Physician (Cardiothoracic Surgery)     Subjective:     Review of Systems   Constitutional: Negative.  Negative for fever and weight loss.   HENT:  Negative for congestion, hearing loss and sore throat.    Eyes:  Negative for blurred vision.   Respiratory:  Negative for cough and shortness of breath.    Cardiovascular:  Negative for chest pain, palpitations and leg swelling.   Gastrointestinal:  Negative for abdominal pain, blood in stool, constipation, diarrhea, nausea and vomiting.   Genitourinary:  Negative for dysuria, frequency, hematuria and urgency.   Musculoskeletal:  Negative for joint pain.   Skin:  Negative for rash.   Neurological:  Negative for weakness and headaches.   Psychiatric/Behavioral:  Negative for depression. The patient is not nervous/anxious and does not have insomnia.      See HPI for details  All Other ROS: Negative except as stated in HPI    Objective:     BP (!) 96/59   Wt 75.9 kg (167 lb 4.8 oz)   SpO2 96%   BMI 28.72 kg/m²     Physical Exam  Vitals reviewed.   Constitutional:       Appearance: Normal appearance.   HENT:      Head: Normocephalic and atraumatic.      Right Ear: Tympanic membrane, ear canal and external ear normal.      Left Ear: Tympanic membrane, ear canal and external ear normal.      Nose: Nose normal. No congestion or rhinorrhea.      Mouth/Throat:      Mouth: Mucous membranes are moist.      Pharynx: Oropharynx is clear.   Eyes:      General: No scleral icterus.     Extraocular Movements: Extraocular movements intact.      Conjunctiva/sclera: Conjunctivae normal.   Cardiovascular:      Rate and Rhythm: Normal rate and regular rhythm.      Pulses: Normal pulses.      Heart sounds: Normal heart sounds.   Pulmonary:      Effort: Pulmonary effort is  normal.      Breath sounds: Normal breath sounds.   Abdominal:      General: Abdomen is flat. Bowel sounds are normal.      Palpations: Abdomen is soft.      Tenderness: There is no abdominal tenderness.   Musculoskeletal:         General: No swelling or deformity. Normal range of motion.      Cervical back: Normal range of motion and neck supple.   Skin:     General: Skin is warm and dry.   Neurological:      Mental Status: She is alert and oriented to person, place, and time.   Psychiatric:         Mood and Affect: Mood normal.         Behavior: Behavior normal.         Thought Content: Thought content normal.         Judgment: Judgment normal.       Assessment:       ICD-10-CM ICD-9-CM   1. Wellness examination  Z00.00 V70.0   2. Primary hypertension  I10 401.9   3. Personal history of nicotine dependence  Z87.891 V15.82   4. DIGNA (generalized anxiety disorder)  F41.1 300.02   5. Migraine without status migrainosus, not intractable, unspecified migraine type  G43.909 346.90        Plan:     1. Wellness examination  Overview:  Routine labs and preventative care discussed. Obtain labs from specialist  Continue healthy lifestyle modifications        2. Primary hypertension  Overview:  Blood pressure goal <140/90 (<130/80 if otherwise noted), recommend DASH diet, record BP at home daily and bring log to next office visit to assure that home cuff is calibrated at minimum every 12 months  Stop Prinzide for now. IF BP at home increases, resume      3. Personal history of nicotine dependence  Overview:  Cessation counseling 3-5min  Wellbutrin 150mg qd x 3d, then bid after. AE discussed  Resources offered    Orders:  -     buPROPion (WELLBUTRIN SR) 150 MG TBSR 12 hr tablet; Take 1 tablet (150 mg total) by mouth 2 (two) times daily.  Dispense: 60 tablet; Refill: 11    4. DIGNA (generalized anxiety disorder)  Overview:  Recommend CBT and mindfulness therapy with biofeedback techniques and deep breathing exercises, continue  current medication regimen    Orders:  -     EScitalopram oxalate (LEXAPRO) 20 MG tablet; Take 1 tablet (20 mg total) by mouth every evening.  Dispense: 90 tablet; Refill: 3  -     ALPRAZolam (XANAX) 0.5 MG tablet; Take 1 tablet (0.5 mg total) by mouth nightly as needed for Anxiety.  Dispense: 30 tablet; Refill: 5    5. Migraine without status migrainosus, not intractable, unspecified migraine type  -     butalbital-acetaminophen-caff -40 mg Cap; Take 1 capsule by mouth 3 (three) times daily. PRN Headache  Dispense: 30 capsule; Refill: 5         Medication List with Changes/Refills   New Medications    BUPROPION (WELLBUTRIN SR) 150 MG TBSR 12 HR TABLET    Take 1 tablet (150 mg total) by mouth 2 (two) times daily.       Start Date: 7/20/2023 End Date: 7/19/2024   Current Medications    ALBUTEROL (PROVENTIL/VENTOLIN HFA) 90 MCG/ACTUATION INHALER    USE 2 INHALATIONS BY MOUTH EVERY 6 HOURS AS NEEDED FOR WHEEZING       Start Date: 3/23/2023 End Date: --    ATENOLOL (TENORMIN) 25 MG TABLET    See Instructions, Take 1 tablet by mouth once daily, # 90 tab(s), 3 Refill(s), Pharmacy: [facility], 164, cm, Height/Length Dosing, 08/15/21 8:13:00 CDT, 65.7, kg, Weight Dosing, 08/15/21 8:13:00 CDT       Start Date: 2/14/2023 End Date: --    BUDESONIDE-FORMOTEROL 80-4.5 MCG (SYMBICORT) 80-4.5 MCG/ACTUATION HFAA    Inhale into the lungs.       Start Date: 8/13/2021 End Date: --    GABAPENTIN (NEURONTIN) 300 MG CAPSULE    Take 1 capsule (300 mg total) by mouth 2 (two) times daily.       Start Date: 2/14/2023 End Date: --    LISINOPRIL-HYDROCHLOROTHIAZIDE (PRINZIDE,ZESTORETIC) 20-12.5 MG PER TABLET    Take 1 tablet by mouth once daily.       Start Date: 2/14/2023 End Date: --    NICOTINE (NICODERM CQ) 21 MG/24 HR    Place 1 patch onto the skin once daily.       Start Date: 6/28/2023 End Date: --    NIFEDIPINE, BULK, MISC    APPLY UP TO 1 GRAM TO THE INTERNAL AND EXTERNAL ANUS TWICE DAILY AS DIRECTED.       Start Date:  10/5/2022 End Date: --    POTASSIUM CHLORIDE (MICRO-K) 10 MEQ CPSR    Take 1 capsule (10 mEq total) by mouth once daily.       Start Date: 2/14/2023 End Date: --    VALACYCLOVIR (VALTREX) 1000 MG TABLET    Take 1,000 mg by mouth 2 (two) times daily.       Start Date: 5/16/2022 End Date: --   Changed and/or Refilled Medications    Modified Medication Previous Medication    ALPRAZOLAM (XANAX) 0.5 MG TABLET ALPRAZolam (XANAX) 0.5 MG tablet       Take 1 tablet (0.5 mg total) by mouth nightly as needed for Anxiety.    Take 1 tablet (0.5 mg total) by mouth nightly as needed for Anxiety.       Start Date: 7/20/2023 End Date: --    Start Date: 2/14/2023 End Date: 7/20/2023    BUTALBITAL-ACETAMINOPHEN-CAFF -40 MG CAP butalbital-acetaminophen-caff -40 mg Cap       Take 1 capsule by mouth 3 (three) times daily. PRN Headache    Take 1 capsule by mouth 3 (three) times daily. PRN Headache       Start Date: 7/20/2023 End Date: 6/19/2024    Start Date: 10/10/2022End Date: 7/20/2023    ESCITALOPRAM OXALATE (LEXAPRO) 20 MG TABLET EScitalopram oxalate (LEXAPRO) 20 MG tablet       Take 1 tablet (20 mg total) by mouth every evening.    Take 1 tablet (20 mg total) by mouth every evening.       Start Date: 7/20/2023 End Date: --    Start Date: 2/14/2023 End Date: 7/20/2023        The patient's Health Maintenance was reviewed and the following appears to be due at this time:   Health Maintenance Due   Topic Date Due    Hepatitis C Screening  Never done    Pneumococcal Vaccines (Age 0-64) (1 - PCV) Never done    HIV Screening  Never done    TETANUS VACCINE  Never done    Hemoglobin A1c (Diabetic Prevention Screening)  Never done    COVID-19 Vaccine (3 - Pfizer series) 11/12/2021     Health Maintenance Topics with due status: Not Due       Topic Last Completion Date    Colorectal Cancer Screening 10/23/2019    Influenza Vaccine 09/29/2020    Mammogram 12/01/2022    Lipid Panel 01/13/2023        Alcohol/Tobacco Use - Discussed  importance of smoking avoidance/cessation and limiting alcohol intake.    CVD Risk Factors - Reviewed and discussed with patient    Obesity/Physical Activity - BMI = Body mass index is 28.72 kg/m².. Encouraged 30 minute daily physical activity, 5 days per week.     STD screening (At least once 15-65y or when necessary) - future     Depression screening (Every year or when necessary)- denies    Low dose CT (50-79yo with 20 pack year smoking history and currently smoke or have quit in the last 15years. D/C screening after >15yrs quit smoking): future     Osteoporosis Screening (start at 64yo or 50y with risk) - future    Colon Cancer Screening - future    Cervical Cancer Screening - OBGYN    Breast Cancer Screening - specialist    Eye Exam - Recommend annual eye exams.    Dental Exam - Recommend biannual exams.     Vaccinations - will obtain at pharmacy    Follow up in about 1 year (around 7/20/2024) for Wellness, obtain labs from  and order as necessary. In addition to their scheduled follow up, the patient has also been instructed to follow up on as needed basis.

## 2023-08-01 ENCOUNTER — PATIENT MESSAGE (OUTPATIENT)
Dept: PRIMARY CARE CLINIC | Facility: CLINIC | Age: 48
End: 2023-08-01
Payer: COMMERCIAL

## 2023-08-01 DIAGNOSIS — Z87.891 PERSONAL HISTORY OF NICOTINE DEPENDENCE: Primary | ICD-10-CM

## 2023-08-01 RX ORDER — IBUPROFEN 200 MG
1 TABLET ORAL DAILY
Qty: 28 PATCH | Refills: 0 | Status: SHIPPED | OUTPATIENT
Start: 2023-08-01

## 2023-08-01 RX ORDER — NICOTINE 7MG/24HR
1 PATCH, TRANSDERMAL 24 HOURS TRANSDERMAL DAILY
Qty: 28 PATCH | Refills: 0 | Status: SHIPPED | OUTPATIENT
Start: 2023-08-01

## 2023-08-03 ENCOUNTER — TELEPHONE (OUTPATIENT)
Dept: PRIMARY CARE CLINIC | Facility: CLINIC | Age: 48
End: 2023-08-03
Payer: COMMERCIAL

## 2023-08-03 NOTE — TELEPHONE ENCOUNTER
Rec'd call from pt, she stated that she is experiencing a sore throat, and some facial pain. She would like to know if she can have some medication sent to her pharmacy.

## 2023-08-17 ENCOUNTER — PATIENT MESSAGE (OUTPATIENT)
Dept: ADMINISTRATIVE | Facility: OTHER | Age: 48
End: 2023-08-17
Payer: COMMERCIAL

## 2023-10-23 PROBLEM — Z00.00 WELLNESS EXAMINATION: Status: RESOLVED | Noted: 2022-06-01 | Resolved: 2023-10-23

## 2023-11-06 ENCOUNTER — TELEPHONE (OUTPATIENT)
Dept: PRIMARY CARE CLINIC | Facility: CLINIC | Age: 48
End: 2023-11-06
Payer: COMMERCIAL

## 2023-11-06 NOTE — TELEPHONE ENCOUNTER
"----- Message from Nadir Gomez sent at 11/6/2023 10:09 AM CST -----  .Type:  Needs Medical Advice    Who Called: pt   Symptoms (please be specific): temple pains and dizziness    How long has patient had these symptoms:  few months   Pharmacy name and phone #:    Would the patient rather a call back or a response via [facility]? Call back   Best Call Back Number: [phone]  Additional Information: Pt is requesting a Ct scan. I called the back line and was informed that they did not have any openings today and to suggest for the pt to go to the ER. When I expressed the information to the pt, pt states," It cost a fortune to go to the ER and only wants a Cat Scan ordered."         "

## 2023-11-21 ENCOUNTER — LAB REQUISITION (OUTPATIENT)
Dept: LAB | Facility: HOSPITAL | Age: 48
End: 2023-11-21
Payer: COMMERCIAL

## 2023-11-21 DIAGNOSIS — R35.0 FREQUENCY OF MICTURITION: ICD-10-CM

## 2023-11-21 DIAGNOSIS — F17.200 NICOTINE DEPENDENCE, UNSPECIFIED, UNCOMPLICATED: ICD-10-CM

## 2023-11-21 DIAGNOSIS — R14.0 ABDOMINAL DISTENSION (GASEOUS): ICD-10-CM

## 2023-11-21 DIAGNOSIS — R11.0 NAUSEA: ICD-10-CM

## 2023-11-21 DIAGNOSIS — K62.89 OTHER SPECIFIED DISEASES OF ANUS AND RECTUM: ICD-10-CM

## 2023-11-21 DIAGNOSIS — K21.9 GASTRO-ESOPHAGEAL REFLUX DISEASE WITHOUT ESOPHAGITIS: ICD-10-CM

## 2023-11-21 DIAGNOSIS — K60.2 ANAL FISSURE, UNSPECIFIED: ICD-10-CM

## 2023-11-21 LAB
APPEARANCE UR: CLEAR
BACTERIA #/AREA URNS AUTO: ABNORMAL /HPF
BILIRUB UR QL STRIP.AUTO: NEGATIVE
COLOR UR AUTO: ABNORMAL
GLUCOSE UR QL STRIP.AUTO: NORMAL
KETONES UR QL STRIP.AUTO: NEGATIVE
LEUKOCYTE ESTERASE UR QL STRIP.AUTO: NEGATIVE
MUCOUS THREADS URNS QL MICRO: ABNORMAL /LPF
NITRITE UR QL STRIP.AUTO: NEGATIVE
PH UR STRIP.AUTO: 5.5 [PH]
PROT UR QL STRIP.AUTO: NEGATIVE
RBC #/AREA URNS AUTO: ABNORMAL /HPF
RBC UR QL AUTO: ABNORMAL
SP GR UR STRIP.AUTO: 1.02 (ref 1–1.03)
SQUAMOUS #/AREA URNS LPF: ABNORMAL /HPF
UROBILINOGEN UR STRIP-ACNC: NORMAL
WBC #/AREA URNS AUTO: ABNORMAL /HPF

## 2023-11-21 PROCEDURE — 81001 URINALYSIS AUTO W/SCOPE: CPT | Performed by: NURSE PRACTITIONER

## 2024-02-07 DIAGNOSIS — F41.1 GAD (GENERALIZED ANXIETY DISORDER): ICD-10-CM

## 2024-02-07 RX ORDER — ESCITALOPRAM OXALATE 20 MG/1
20 TABLET ORAL NIGHTLY
Qty: 90 TABLET | Refills: 3 | Status: SHIPPED | OUTPATIENT
Start: 2024-02-07 | End: 2024-02-12 | Stop reason: SDUPTHER

## 2024-02-12 DIAGNOSIS — F41.1 GAD (GENERALIZED ANXIETY DISORDER): ICD-10-CM

## 2024-02-12 RX ORDER — ESCITALOPRAM OXALATE 20 MG/1
20 TABLET ORAL NIGHTLY
Qty: 90 TABLET | Refills: 3 | Status: SHIPPED | OUTPATIENT
Start: 2024-02-12 | End: 2024-02-26 | Stop reason: SDUPTHER

## 2024-02-26 DIAGNOSIS — F41.1 GAD (GENERALIZED ANXIETY DISORDER): ICD-10-CM

## 2024-02-26 DIAGNOSIS — I10 HYPERTENSION, UNSPECIFIED TYPE: ICD-10-CM

## 2024-02-26 RX ORDER — ESCITALOPRAM OXALATE 20 MG/1
20 TABLET ORAL NIGHTLY
Qty: 90 TABLET | Refills: 3 | Status: SHIPPED | OUTPATIENT
Start: 2024-02-26

## 2024-02-26 RX ORDER — LISINOPRIL AND HYDROCHLOROTHIAZIDE 12.5; 2 MG/1; MG/1
1 TABLET ORAL DAILY
Qty: 90 TABLET | Refills: 3 | Status: SHIPPED | OUTPATIENT
Start: 2024-02-26 | End: 2024-02-28 | Stop reason: SDUPTHER

## 2024-02-26 RX ORDER — ATENOLOL 25 MG/1
TABLET ORAL
Qty: 30 TABLET | Refills: 5 | Status: SHIPPED | OUTPATIENT
Start: 2024-02-26 | End: 2024-02-28 | Stop reason: SDUPTHER

## 2024-02-28 DIAGNOSIS — I10 HYPERTENSION, UNSPECIFIED TYPE: ICD-10-CM

## 2024-02-28 RX ORDER — ATENOLOL 25 MG/1
TABLET ORAL
Qty: 30 TABLET | Refills: 5 | Status: SHIPPED | OUTPATIENT
Start: 2024-02-28 | End: 2024-03-14 | Stop reason: SDUPTHER

## 2024-02-28 RX ORDER — LISINOPRIL AND HYDROCHLOROTHIAZIDE 12.5; 2 MG/1; MG/1
1 TABLET ORAL DAILY
Qty: 90 TABLET | Refills: 3 | Status: SHIPPED | OUTPATIENT
Start: 2024-02-28 | End: 2024-03-14 | Stop reason: SDUPTHER

## 2024-03-01 DIAGNOSIS — M54.6 ACUTE LEFT-SIDED THORACIC BACK PAIN: ICD-10-CM

## 2024-03-01 RX ORDER — GABAPENTIN 300 MG/1
300 CAPSULE ORAL 2 TIMES DAILY
Qty: 60 CAPSULE | Refills: 5 | Status: SHIPPED | OUTPATIENT
Start: 2024-03-01

## 2024-03-14 DIAGNOSIS — I10 HYPERTENSION, UNSPECIFIED TYPE: ICD-10-CM

## 2024-03-14 RX ORDER — LISINOPRIL AND HYDROCHLOROTHIAZIDE 12.5; 2 MG/1; MG/1
1 TABLET ORAL DAILY
Qty: 90 TABLET | Refills: 3 | Status: SHIPPED | OUTPATIENT
Start: 2024-03-14 | End: 2024-03-18 | Stop reason: SDUPTHER

## 2024-03-14 RX ORDER — ATENOLOL 25 MG/1
TABLET ORAL
Qty: 30 TABLET | Refills: 5 | Status: SHIPPED | OUTPATIENT
Start: 2024-03-14 | End: 2024-03-18 | Stop reason: SDUPTHER

## 2024-03-18 DIAGNOSIS — I10 HYPERTENSION, UNSPECIFIED TYPE: ICD-10-CM

## 2024-03-18 RX ORDER — ATENOLOL 25 MG/1
TABLET ORAL
Qty: 30 TABLET | Refills: 5 | Status: SHIPPED | OUTPATIENT
Start: 2024-03-18

## 2024-03-18 RX ORDER — LISINOPRIL AND HYDROCHLOROTHIAZIDE 12.5; 2 MG/1; MG/1
1 TABLET ORAL DAILY
Qty: 90 TABLET | Refills: 3 | Status: SHIPPED | OUTPATIENT
Start: 2024-03-18

## 2024-05-07 ENCOUNTER — TELEPHONE (OUTPATIENT)
Dept: PRIMARY CARE CLINIC | Facility: CLINIC | Age: 49
End: 2024-05-07
Payer: COMMERCIAL

## 2024-05-07 DIAGNOSIS — E87.6 LOW POTASSIUM SYNDROME: ICD-10-CM

## 2024-05-07 RX ORDER — POTASSIUM CHLORIDE 750 MG/1
10 CAPSULE, EXTENDED RELEASE ORAL DAILY
Qty: 90 CAPSULE | Refills: 3 | Status: SHIPPED | OUTPATIENT
Start: 2024-05-07

## 2024-05-07 NOTE — TELEPHONE ENCOUNTER
----- Message from Aracelis Gonzalez sent at 5/7/2024  9:10 AM CDT -----  .Type:  RX Refill Request    Who Called: pt  Refill or New Rx:refill  RX Name and Strength:potassium chloride (MICRO-K) 10 MEQ CpSR  How is the patient currently taking it? (ex. 1XDay):1/day  Is this a 30 day or 90 day RX:90  Preferred Pharmacy with phone number:[facility] in [place] [place]  Local or Mail Order:Local  Ordering Provider:Gregg  Would the patient rather a call back or a response via [facility]?   Best Call Back Number:[phone]  Additional Information: potassium chloride (MICRO-K) 10 MEQ CpSR

## 2024-07-16 NOTE — ASSESSMENT & PLAN NOTE
Provided Marilynn Robles with a 5-10 year written screening schedule and personal prevention plan. Recommendations were developed using the USPSTF age appropriate recommendations. Education, counseling, and referrals were provided as needed. After Visit Summary printed and given to patient which includes a list of additional screenings\tests needed.

## 2024-07-16 NOTE — PROGRESS NOTES
Date: 07/24/2024  Patient ID: [id number]   Chief Complaint: Annual Exam    HPI:   Marilynn Robles is a 49 y.o. female here today for an annual wellness visit. Reviewed and discussed lab results: wnl except elevated total chol and LDL. She has not had bloodwork completed prior to visit as ordered but will do so in the near future.     Overall she feels well. Had migraine 2wks before around temple and behind R eye with pressure/short-lived sharp. She also sees flashes. Temple is sensitive to touch but skin doesn't feel firm or different, and this is 2nd or 3rd time that it has been very bad. Sometimes she gets blurry vision. Tylenol doesn't help. Allergic to NSAIDs. Fioricet helped minimally  She tried quitting smoking last year, but insurance denied breast reduction, which she became disappointed.  Therapist stated Lexapro might not be working enough.  Diet: eating less carbs  Activity level: not much    Patient Active Problem List   Diagnosis    Wellness examination    DIGNA (generalized anxiety disorder)    Low potassium syndrome    Diffuse dermal angiomatosis of breast    Hypertension    Thoracic back pain    Personal history of nicotine dependence    Positive depression screening    Ophthalmoplegic migraine, not intractable    Gastroesophageal reflux disease     Outpatient Medications Marked as Taking for the 7/24/24 encounter (Office Visit) with Jada Escobedo MD   Medication Sig Dispense Refill    albuterol (PROVENTIL/VENTOLIN HFA) 90 mcg/actuation inhaler USE 2 INHALATIONS BY MOUTH EVERY 6 HOURS AS NEEDED FOR WHEEZING 17 g 6    [DISCONTINUED] ALPRAZolam (XANAX) 0.5 MG tablet Take 1 tablet (0.5 mg total) by mouth nightly as needed for Anxiety. 30 tablet 5    [DISCONTINUED] atenoloL (TENORMIN) 25 MG tablet See Instructions, Take 1 tablet by mouth once daily, # 90 tab(s), 3 Refill(s), Pharmacy: [facility], 164, cm, Height/Length Dosing, 08/15/21 8:13:00 CDT, 65.7, kg, Weight Dosing, 08/15/21  8:13:00 CDT 30 tablet 5    [DISCONTINUED] butalbital-acetaminophen-caffeine -40 mg (ESGIC) -40 mg per tablet Take 1 tablet by mouth 3 (three) times daily as needed for Pain.      [DISCONTINUED] EScitalopram oxalate (LEXAPRO) 20 MG tablet Take 1 tablet (20 mg total) by mouth every evening. 90 tablet 3    [DISCONTINUED] gabapentin (NEURONTIN) 300 MG capsule Take 1 capsule (300 mg total) by mouth 2 (two) times daily. 60 capsule 5    [DISCONTINUED] lisinopriL-hydrochlorothiazide (PRINZIDE,ZESTORETIC) 20-12.5 mg per tablet Take 1 tablet by mouth once daily. 90 tablet 3    [DISCONTINUED] pantoprazole (PROTONIX) 40 MG tablet 1 tab(s) orally once a day      [DISCONTINUED] potassium chloride (MICRO-K) 10 MEQ CpSR Take 1 capsule (10 mEq total) by mouth once daily. 90 capsule 3     Past Medical History:   Diagnosis Date    Anxiety disorder, unspecified     Asymptomatic varicose veins of unspecified lower extremity     Cellulitis of right breast 10/10/2022    COVID-19 09/07/2022    Paxlovid for 5 days initiated today, Tessalon as needed for cough, ER precautions for respiratory distress despite current management with home breathing treatments as needed every 4 hours for persistent wheezing.    Diffuse dermal angiomatosis of breast 09/2022    right    Diffuse dermal angiomatosis of breast     Diverticulitis     Elevated DHEA 01/19/2023    Deescalate DHEA supplementation to 50%      Endometriosis, unspecified     HTN (hypertension)     Hypokalemia 09/07/2022    Increase daily potassium from 8 up to 10 mEq    Other specified noninfective gastroenteritis and colitis     Shingles     x9. residual right head pain     Past Surgical History:   Procedure Laterality Date    APPENDECTOMY  1993    BREAST BIOPSY Right     hemorroid operation      SINUS SURGERY      TOTAL ABDOMINAL HYSTERECTOMY       Review of patient's allergies indicates:   Allergen Reactions    Aspirin Shortness Of Breath     Other reaction(s): severe asthma  attack    Ibuprofen      Other reaction(s): severe asthma attack    Penicillins      Other reaction(s): severe asthma attack    Codeine Anxiety     Other reaction(s): makes her anxious     Family History   Problem Relation Name Age of Onset    Arthritis Mother Beatriz Senia     Hypertension Mother Beatriz Senia     Stroke Mother Beatriz Senia     Arthritis Father Sukhjinder Robles Jr.     Stroke Maternal Grandmother Bonnie Robles     Arthritis Son Mick Hester     Arthritis Sister Alejandra Ch     Hypertension Sister Alejandra Ch     Arthritis Brother Saeid Asencio     Hypertension Brother Saeid Asencio     Arthritis Son Azeem Hester       Social History     Socioeconomic History    Marital status:    Tobacco Use    Smoking status: Every Day     Current packs/day: 1.00     Types: Cigarettes    Smokeless tobacco: Never    Tobacco comments:     In total about 15yrs smoked 1ppd   Substance and Sexual Activity    Alcohol use: Yes     Alcohol/week: 8.0 standard drinks of alcohol     Types: 8 Cans of beer per week    Drug use: Never    Sexual activity: Yes     Partners: Male     Birth control/protection: None     Social Determinants of Health     Financial Resource Strain: Low Risk  (7/24/2024)    Overall Financial Resource Strain (CARDIA)     Difficulty of Paying Living Expenses: Not hard at all   Food Insecurity: No Food Insecurity (7/24/2024)    Hunger Vital Sign     Worried About Running Out of Food in the Last Year: Never true     Ran Out of Food in the Last Year: Never true   Transportation Needs: No Transportation Needs (7/24/2024)    TRANSPORTATION NEEDS     Transportation : No   Physical Activity: Inactive (7/24/2024)    Exercise Vital Sign     Days of Exercise per Week: 0 days     Minutes of Exercise per Session: 0 min   Stress: No Stress Concern Present (7/24/2024)    [employer] - Occupational Stress Questionnaire     Feeling of Stress : Not at all   Housing  "Stability: Low Risk  (7/24/2024)    Housing Stability Vital Sign     Unable to Pay for Housing in the Last Year: No     Homeless in the Last Year: No     Patient Care Team:  Jada Escobedo MD as PCP - General (Family Medicine)  Fab Bedoya MD as Consulting Physician (Gastroenterology)  Allan Pearce MD (Plastic Surgery)  Eagle Larios DO (Surgery)  Isaiah Brooks MD as Consulting Physician (Cardiothoracic Surgery)  Ivan Menon Jr., MD as Consulting Physician (Obstetrics and Gynecology)  Kirti Rubio MD (Dermatology)     Subjective:     Review of Systems   Constitutional: Negative.  Negative for fever and weight loss.   HENT:  Negative for congestion, hearing loss and sore throat.    Eyes:  Negative for blurred vision.   Respiratory:  Negative for cough and shortness of breath.    Cardiovascular:  Negative for chest pain, palpitations and leg swelling.   Gastrointestinal:  Negative for abdominal pain, blood in stool, constipation, diarrhea, nausea and vomiting.   Genitourinary:  Negative for dysuria, frequency, hematuria and urgency.   Musculoskeletal:  Negative for joint pain.   Skin:  Negative for rash.   Neurological:  Positive for headaches. Negative for weakness.   Psychiatric/Behavioral:  Positive for depression. The patient is not nervous/anxious and does not have insomnia.        See HPI for details  All Other ROS: Negative except as stated in HPI    Objective:     /72   Pulse 78   Temp 97.8 °F (36.6 °C)   Ht 5' 4" (1.626 m)   Wt 72.8 kg (160 lb 9.6 oz)   SpO2 97%   BMI 27.57 kg/m²     Physical Exam  Vitals reviewed.   Constitutional:       Appearance: Normal appearance.   HENT:      Head: Normocephalic and atraumatic.      Right Ear: Tympanic membrane, ear canal and external ear normal.      Left Ear: Tympanic membrane, ear canal and external ear normal.      Nose: Nose normal. No congestion or rhinorrhea.      Mouth/Throat:      Mouth: Mucous membranes are " moist.      Pharynx: Oropharynx is clear.   Eyes:      General: No scleral icterus.     Extraocular Movements: Extraocular movements intact.      Conjunctiva/sclera: Conjunctivae normal.   Cardiovascular:      Rate and Rhythm: Normal rate and regular rhythm.      Pulses: Normal pulses.      Heart sounds: Normal heart sounds.   Pulmonary:      Effort: Pulmonary effort is normal.      Breath sounds: Normal breath sounds.   Abdominal:      General: Abdomen is flat. Bowel sounds are normal.      Palpations: Abdomen is soft.      Tenderness: There is no abdominal tenderness.   Musculoskeletal:         General: No swelling or deformity. Normal range of motion.      Cervical back: Normal range of motion and neck supple.   Skin:     General: Skin is warm and dry.   Neurological:      Mental Status: She is alert and oriented to person, place, and time.   Psychiatric:         Mood and Affect: Mood normal.         Behavior: Behavior normal.         Thought Content: Thought content normal.         Judgment: Judgment normal.         Assessment:       ICD-10-CM ICD-9-CM   1. Wellness examination  Z00.00 V70.0   2. Depression, unspecified depression type  F32.A 311   3. Diffuse dermal angiomatosis of breast  D18.01 228.01   4. DIGNA (generalized anxiety disorder)  F41.1 300.02   5. Primary hypertension  I10 401.9   6. Personal history of nicotine dependence  Z87.891 V15.82   7. Ophthalmoplegic migraine, not intractable  G43.B0 346.20   8. Hypertension, unspecified type  I10 401.9   9. Acute left-sided thoracic back pain  M54.6 724.1   10. Low potassium syndrome  E87.6 276.8   11. Gastroesophageal reflux disease, unspecified whether esophagitis present  K21.9 530.81        Plan:     1. Wellness examination  Assessment & Plan:  Provided Marilynn Robles with a 5-10 year written screening schedule and personal prevention plan. Recommendations were developed using the USPSTF age appropriate recommendations. Education, counseling,  and referrals were provided as needed. After Visit Summary printed and given to patient which includes a list of additional screenings\tests needed.         2. Depression, unspecified depression type  Assessment & Plan:  Continue therapy   Switch Lexapro to Zoloft 50 mg daily    Orders:  -     sertraline (ZOLOFT) 50 MG tablet; Take 1 tablet (50 mg total) by mouth once daily.  Dispense: 30 tablet; Refill: 11    3. Diffuse dermal angiomatosis of breast  Assessment & Plan:  Continue f/u with dermatology and general surgery      4. DIGNA (generalized anxiety disorder)  Assessment & Plan:  Recommend CBT and mindfulness therapy with biofeedback techniques and deep breathing exercises, continue current medication regimen with Xanax 0.5 mg q.d. p.r.n.    Orders:  -     ALPRAZolam (XANAX) 0.5 MG tablet; Take 1 tablet (0.5 mg total) by mouth nightly as needed for Anxiety.  Dispense: 30 tablet; Refill: 5    5. Primary hypertension  Assessment & Plan:  Stable  Continue current medication regimen:  Prinzide 2012.5 mg daily  Blood pressure at goal <140/90 (<130/80 if otherwise noted)  Recommend DASH diet  Avoid tobacco use  Record BP at home daily and bring log to next office visit, assure that home cuff is calibrated at minimum every 12 months      Orders:  -     atenoloL (TENORMIN) 25 MG tablet; See Instructions, Take 1 tablet by mouth once daily, # 90 tab(s), 3 Refill(s), Pharmacy: [facility], 164, cm, Height/Length Dosing, 08/15/21 8:13:00 CDT, 65.7, kg, Weight Dosing, 08/15/21 8:13:00 CDT  Dispense: 30 tablet; Refill: 5    6. Personal history of nicotine dependence  Assessment & Plan:  Counseled patient on tobacco cessation and risks of continued tobacco use 3-5min        7. Ophthalmoplegic migraine, not intractable  Assessment & Plan:  Discussed reducing caffeine intake   Continue Fioricet p.r.n.   We will add sumatriptan 25 mg p.r.n.  Obtain MRI brain since patient did not have workup previously  Also obtain  temporal Doppler since headache is around temporal region    Orders:  -     MRI Brain Without Contrast; Future; Expected date: 07/24/2024  -     sumatriptan (IMITREX) 25 MG Tab; Take 1 tablet (25 mg total) by mouth every 2 (two) hours as needed (migraine).  Dispense: 30 tablet; Refill: 11  -     butalbital-acetaminophen-caffeine -40 mg (ESGIC) -40 mg per tablet; Take 1 tablet by mouth 3 (three) times daily as needed for Pain.  Dispense: 90 tablet; Refill: 3  -     Transcranial doppler intracran,limited; Future  -      Transcran Doppler [employer]; Future; Expected date: 07/24/2024    8. Hypertension, unspecified type  Assessment & Plan:  Stable  Continue current medication regimen:  Prinzide 2012.5 mg daily  Blood pressure at goal <140/90 (<130/80 if otherwise noted)  Recommend DASH diet  Avoid tobacco use  Record BP at home daily and bring log to next office visit, assure that home cuff is calibrated at minimum every 12 months      Orders:  -     lisinopriL-hydrochlorothiazide (PRINZIDE,ZESTORETIC) 20-12.5 mg per tablet; Take 1 tablet by mouth once daily.  Dispense: 90 tablet; Refill: 3    9. Acute left-sided thoracic back pain  Overview:  Warm compresses with stretching exercises recommended with over-the-counter NSAIDs with adequate hydration and Flexeril as needed    Orders:  -     gabapentin (NEURONTIN) 300 MG capsule; Take 1 capsule (300 mg total) by mouth 2 (two) times daily.  Dispense: 60 capsule; Refill: 5    10. Low potassium syndrome  Assessment & Plan:  Stable   Continue potassium chloride 10 mEq daily    Orders:  -     potassium chloride (MICRO-K) 10 MEQ CpSR; Take 1 capsule (10 mEq total) by mouth once daily.  Dispense: 90 capsule; Refill: 3    11. Gastroesophageal reflux disease, unspecified whether esophagitis present  Assessment & Plan:  Stable   Continue Protonix 40 mg daily    Orders:  -     pantoprazole (PROTONIX) 40 MG tablet; Take 1 tablet (40 mg total) by mouth once  daily.  Dispense: 90 tablet; Refill: 3         Medication List with Changes/Refills   New Medications    SERTRALINE (ZOLOFT) 50 MG TABLET    Take 1 tablet (50 mg total) by mouth once daily.       Start Date: 7/24/2024 End Date: 7/24/2025    SUMATRIPTAN (IMITREX) 25 MG TAB    Take 1 tablet (25 mg total) by mouth every 2 (two) hours as needed (migraine).       Start Date: 7/24/2024 End Date: 8/23/2024   Current Medications    ALBUTEROL (PROVENTIL/VENTOLIN HFA) 90 MCG/ACTUATION INHALER    USE 2 INHALATIONS BY MOUTH EVERY 6 HOURS AS NEEDED FOR WHEEZING       Start Date: 3/23/2023 End Date: --    BUDESONIDE-FORMOTEROL 80-4.5 MCG (SYMBICORT) 80-4.5 MCG/ACTUATION HFAA    Inhale into the lungs.       Start Date: 8/13/2021 End Date: --    BUPROPION (WELLBUTRIN SR) 150 MG TBSR 12 HR TABLET    Take 1 tablet (150 mg total) by mouth 2 (two) times daily.       Start Date: 7/20/2023 End Date: 7/19/2024    NICOTINE (NICODERM CQ) 14 MG/24 HR    Place 1 patch onto the skin once daily.       Start Date: 8/1/2023  End Date: --    NICOTINE (NICODERM CQ) 21 MG/24 HR    Place 1 patch onto the skin once daily.       Start Date: 6/28/2023 End Date: --    NICOTINE (NICODERM CQ) 7 MG/24 HR    Place 1 patch onto the skin once daily.       Start Date: 8/1/2023  End Date: --    NIFEDIPINE, BULK, MISC    APPLY UP TO 1 GRAM TO THE INTERNAL AND EXTERNAL ANUS TWICE DAILY AS DIRECTED.       Start Date: 10/5/2022 End Date: --    VALACYCLOVIR (VALTREX) 1000 MG TABLET    Take 1,000 mg by mouth 2 (two) times daily.       Start Date: 5/16/2022 End Date: --   Changed and/or Refilled Medications    Modified Medication Previous Medication    ALPRAZOLAM (XANAX) 0.5 MG TABLET ALPRAZolam (XANAX) 0.5 MG tablet       Take 1 tablet (0.5 mg total) by mouth nightly as needed for Anxiety.    Take 1 tablet (0.5 mg total) by mouth nightly as needed for Anxiety.       Start Date: 7/24/2024 End Date: --    Start Date: 7/20/2023 End Date: 7/24/2024    ATENOLOL (TENORMIN)  25 MG TABLET atenoloL (TENORMIN) 25 MG tablet       See Instructions, Take 1 tablet by mouth once daily, # 90 tab(s), 3 Refill(s), Pharmacy: [facility], 164, cm, Height/Length Dosing, 08/15/21 8:13:00 CDT, 65.7, kg, Weight Dosing, 08/15/21 8:13:00 CDT    See Instructions, Take 1 tablet by mouth once daily, # 90 tab(s), 3 Refill(s), Pharmacy: [facility], 164, cm, Height/Length Dosing, 08/15/21 8:13:00 CDT, 65.7, kg, Weight Dosing, 08/15/21 8:13:00 CDT       Start Date: 7/24/2024 End Date: --    Start Date: 3/18/2024 End Date: 7/24/2024    BUTALBITAL-ACETAMINOPHEN-CAFFEINE -40 MG (ESGIC) -40 MG PER TABLET butalbital-acetaminophen-caffeine -40 mg (ESGIC) -40 mg per tablet       Take 1 tablet by mouth 3 (three) times daily as needed for Pain.    Take 1 tablet by mouth 3 (three) times daily as needed for Pain.       Start Date: 7/24/2024 End Date: --    Start Date: --        End Date: 7/24/2024    GABAPENTIN (NEURONTIN) 300 MG CAPSULE gabapentin (NEURONTIN) 300 MG capsule       Take 1 capsule (300 mg total) by mouth 2 (two) times daily.    Take 1 capsule (300 mg total) by mouth 2 (two) times daily.       Start Date: 7/24/2024 End Date: --    Start Date: 3/1/2024  End Date: 7/24/2024    LISINOPRIL-HYDROCHLOROTHIAZIDE (PRINZIDE,ZESTORETIC) 20-12.5 MG PER TABLET lisinopriL-hydrochlorothiazide (PRINZIDE,ZESTORETIC) 20-12.5 mg per tablet       Take 1 tablet by mouth once daily.    Take 1 tablet by mouth once daily.       Start Date: 7/24/2024 End Date: --    Start Date: 3/18/2024 End Date: 7/24/2024    PANTOPRAZOLE (PROTONIX) 40 MG TABLET pantoprazole (PROTONIX) 40 MG tablet       Take 1 tablet (40 mg total) by mouth once daily.    1 tab(s) orally once a day       Start Date: 7/24/2024 End Date: --    Start Date: --        End Date: 7/24/2024    POTASSIUM CHLORIDE (MICRO-K) 10 MEQ CPSR potassium chloride (MICRO-K) 10 MEQ CpSR       Take 1 capsule (10 mEq total) by mouth once daily.     Take 1 capsule (10 mEq total) by mouth once daily.       Start Date: 7/24/2024 End Date: --    Start Date: 5/7/2024  End Date: 7/24/2024   Discontinued Medications    ESCITALOPRAM OXALATE (LEXAPRO) 20 MG TABLET    Take 1 tablet (20 mg total) by mouth every evening.       Start Date: 2/26/2024 End Date: 7/24/2024        The patient's Health Maintenance was reviewed and the following appears to be due at this time:   Health Maintenance Due   Topic Date Due    Hepatitis C Screening  Never done    Pneumococcal Vaccines (Age 0-64) (1 of 2 - PCV) Never done    HIV Screening  Never done    TETANUS VACCINE  Never done    Hemoglobin A1c (Diabetic Prevention Screening)  Never done    COVID-19 Vaccine (3 - 2023-24 season) 09/01/2023    Shingles Vaccine (2 of 2) 07/21/2024     Health Maintenance Topics with due status: Not Due       Topic Last Completion Date    Colorectal Cancer Screening 10/23/2019    Influenza Vaccine 09/29/2020    Mammogram 12/21/2023    Lipid Panel 07/19/2024        Alcohol/Tobacco Use - Discussed importance of smoking avoidance/cessation and limiting alcohol intake.    CVD Risk Factors - Reviewed and discussed with patient    Obesity/Physical Activity - BMI = Body mass index is 27.57 kg/m².. Encouraged 30 minute daily physical activity, 5 days per week.     STD screening (At least once 15-65y or when necessary) - deferred     Depression screening (Every year or when necessary)- discussed    Breast Cancer Screening - 12/21/23    Cervical Cancer Screening - hysx    Colon Cancer Screening - Aureliano anal fissure, hemorrhoids 10/2022. Every 5yrs    Osteoporosis Screening (start at 64yo or 50y with risk) - future    Low dose CT (50-81yo with 20 pack year smoking history and currently smoke or have quit in the last 15years. D/C screening after >15yrs quit smoking): 15py today     Eye Exam - Recommend annual eye exams.    Dental Exam - Recommend biannual exams.     Vaccinations - will obtain at pharmacy    A  separate E/M code has been provided to evaluate additional concerns addressed during the dedicated Wellness Exam.      Follow up in about 6 weeks (around 9/4/2024) for migraine, depression, imaging results. In addition to their scheduled follow up, the patient has also been instructed to follow up on as needed basis.

## 2024-07-17 DIAGNOSIS — Z00.00 WELLNESS EXAMINATION: Primary | ICD-10-CM

## 2024-07-19 ENCOUNTER — LAB VISIT (OUTPATIENT)
Dept: LAB | Facility: HOSPITAL | Age: 49
End: 2024-07-19
Attending: STUDENT IN AN ORGANIZED HEALTH CARE EDUCATION/TRAINING PROGRAM
Payer: COMMERCIAL

## 2024-07-19 DIAGNOSIS — Z00.00 WELLNESS EXAMINATION: ICD-10-CM

## 2024-07-19 LAB
ALBUMIN SERPL-MCNC: 3.8 G/DL (ref 3.5–5)
ALBUMIN/GLOB SERPL: 1.5 RATIO (ref 1.1–2)
ALP SERPL-CCNC: 126 UNIT/L (ref 40–150)
ALT SERPL-CCNC: 16 UNIT/L (ref 0–55)
ANION GAP SERPL CALC-SCNC: 7 MEQ/L
AST SERPL-CCNC: 14 UNIT/L (ref 5–34)
BASOPHILS # BLD AUTO: 0.02 X10(3)/MCL
BASOPHILS NFR BLD AUTO: 0.3 %
BILIRUB SERPL-MCNC: 0.4 MG/DL
BUN SERPL-MCNC: 10.1 MG/DL (ref 9.8–20.1)
CALCIUM SERPL-MCNC: 9.7 MG/DL (ref 8.4–10.2)
CHLORIDE SERPL-SCNC: 104 MMOL/L (ref 98–107)
CHOLEST SERPL-MCNC: 205 MG/DL
CHOLEST/HDLC SERPL: 6 {RATIO} (ref 0–5)
CO2 SERPL-SCNC: 29 MMOL/L (ref 22–29)
CREAT SERPL-MCNC: 0.89 MG/DL (ref 0.55–1.02)
CREAT/UREA NIT SERPL: 11
EOSINOPHIL # BLD AUTO: 0.09 X10(3)/MCL (ref 0–0.9)
EOSINOPHIL NFR BLD AUTO: 1.2 %
ERYTHROCYTE [DISTWIDTH] IN BLOOD BY AUTOMATED COUNT: 13.1 % (ref 11.5–17)
GFR SERPLBLD CREATININE-BSD FMLA CKD-EPI: >60 ML/MIN/1.73/M2
GLOBULIN SER-MCNC: 2.6 GM/DL (ref 2.4–3.5)
GLUCOSE SERPL-MCNC: 94 MG/DL (ref 74–100)
HCT VFR BLD AUTO: 40.8 % (ref 37–47)
HDLC SERPL-MCNC: 36 MG/DL (ref 35–60)
HGB BLD-MCNC: 13.5 G/DL (ref 12–16)
IMM GRANULOCYTES # BLD AUTO: 0.02 X10(3)/MCL (ref 0–0.04)
IMM GRANULOCYTES NFR BLD AUTO: 0.3 %
LDLC SERPL CALC-MCNC: 142 MG/DL (ref 50–140)
LYMPHOCYTES # BLD AUTO: 3.11 X10(3)/MCL (ref 0.6–4.6)
LYMPHOCYTES NFR BLD AUTO: 40.5 %
MCH RBC QN AUTO: 33.8 PG (ref 27–31)
MCHC RBC AUTO-ENTMCNC: 33.1 G/DL (ref 33–36)
MCV RBC AUTO: 102 FL (ref 80–94)
MONOCYTES # BLD AUTO: 0.67 X10(3)/MCL (ref 0.1–1.3)
MONOCYTES NFR BLD AUTO: 8.7 %
NEUTROPHILS # BLD AUTO: 3.76 X10(3)/MCL (ref 2.1–9.2)
NEUTROPHILS NFR BLD AUTO: 49 %
NRBC BLD AUTO-RTO: 0 %
PLATELET # BLD AUTO: 227 X10(3)/MCL (ref 130–400)
PMV BLD AUTO: 10.6 FL (ref 7.4–10.4)
POTASSIUM SERPL-SCNC: 3.6 MMOL/L (ref 3.5–5.1)
PROT SERPL-MCNC: 6.4 GM/DL (ref 6.4–8.3)
RBC # BLD AUTO: 4 X10(6)/MCL (ref 4.2–5.4)
SODIUM SERPL-SCNC: 140 MMOL/L (ref 136–145)
TRIGL SERPL-MCNC: 137 MG/DL (ref 37–140)
VLDLC SERPL CALC-MCNC: 27 MG/DL
WBC # BLD AUTO: 7.67 X10(3)/MCL (ref 4.5–11.5)

## 2024-07-19 PROCEDURE — 36415 COLL VENOUS BLD VENIPUNCTURE: CPT

## 2024-07-19 PROCEDURE — 80053 COMPREHEN METABOLIC PANEL: CPT

## 2024-07-19 PROCEDURE — 85025 COMPLETE CBC W/AUTO DIFF WBC: CPT

## 2024-07-19 PROCEDURE — 80061 LIPID PANEL: CPT

## 2024-07-24 ENCOUNTER — OFFICE VISIT (OUTPATIENT)
Dept: PRIMARY CARE CLINIC | Facility: CLINIC | Age: 49
End: 2024-07-24
Payer: COMMERCIAL

## 2024-07-24 VITALS
TEMPERATURE: 98 F | WEIGHT: 160.63 LBS | HEART RATE: 78 BPM | OXYGEN SATURATION: 97 % | HEIGHT: 64 IN | DIASTOLIC BLOOD PRESSURE: 72 MMHG | BODY MASS INDEX: 27.42 KG/M2 | SYSTOLIC BLOOD PRESSURE: 111 MMHG

## 2024-07-24 DIAGNOSIS — Z87.891 PERSONAL HISTORY OF NICOTINE DEPENDENCE: ICD-10-CM

## 2024-07-24 DIAGNOSIS — G43.B0 OPHTHALMOPLEGIC MIGRAINE, NOT INTRACTABLE: ICD-10-CM

## 2024-07-24 DIAGNOSIS — I10 PRIMARY HYPERTENSION: ICD-10-CM

## 2024-07-24 DIAGNOSIS — I10 HYPERTENSION, UNSPECIFIED TYPE: ICD-10-CM

## 2024-07-24 DIAGNOSIS — E87.6 LOW POTASSIUM SYNDROME: ICD-10-CM

## 2024-07-24 DIAGNOSIS — D18.01 DIFFUSE DERMAL ANGIOMATOSIS OF BREAST: ICD-10-CM

## 2024-07-24 DIAGNOSIS — F32.A DEPRESSION, UNSPECIFIED DEPRESSION TYPE: ICD-10-CM

## 2024-07-24 DIAGNOSIS — F41.1 GAD (GENERALIZED ANXIETY DISORDER): ICD-10-CM

## 2024-07-24 DIAGNOSIS — K21.9 GASTROESOPHAGEAL REFLUX DISEASE, UNSPECIFIED WHETHER ESOPHAGITIS PRESENT: ICD-10-CM

## 2024-07-24 DIAGNOSIS — Z00.00 WELLNESS EXAMINATION: Primary | ICD-10-CM

## 2024-07-24 DIAGNOSIS — M54.6 ACUTE LEFT-SIDED THORACIC BACK PAIN: ICD-10-CM

## 2024-07-24 PROBLEM — Z13.31 POSITIVE DEPRESSION SCREENING: Status: ACTIVE | Noted: 2024-07-24

## 2024-07-24 PROBLEM — R79.89 ELEVATED DHEA: Status: RESOLVED | Noted: 2023-01-19 | Resolved: 2024-07-24

## 2024-07-24 PROCEDURE — 99396 PREV VISIT EST AGE 40-64: CPT | Mod: 25,,, | Performed by: STUDENT IN AN ORGANIZED HEALTH CARE EDUCATION/TRAINING PROGRAM

## 2024-07-24 PROCEDURE — 1160F RVW MEDS BY RX/DR IN RCRD: CPT | Mod: CPTII,,, | Performed by: STUDENT IN AN ORGANIZED HEALTH CARE EDUCATION/TRAINING PROGRAM

## 2024-07-24 PROCEDURE — 3074F SYST BP LT 130 MM HG: CPT | Mod: CPTII,,, | Performed by: STUDENT IN AN ORGANIZED HEALTH CARE EDUCATION/TRAINING PROGRAM

## 2024-07-24 PROCEDURE — 4010F ACE/ARB THERAPY RXD/TAKEN: CPT | Mod: CPTII,,, | Performed by: STUDENT IN AN ORGANIZED HEALTH CARE EDUCATION/TRAINING PROGRAM

## 2024-07-24 PROCEDURE — 99406 BEHAV CHNG SMOKING 3-10 MIN: CPT | Mod: ,,, | Performed by: STUDENT IN AN ORGANIZED HEALTH CARE EDUCATION/TRAINING PROGRAM

## 2024-07-24 PROCEDURE — 3078F DIAST BP <80 MM HG: CPT | Mod: CPTII,,, | Performed by: STUDENT IN AN ORGANIZED HEALTH CARE EDUCATION/TRAINING PROGRAM

## 2024-07-24 PROCEDURE — 3008F BODY MASS INDEX DOCD: CPT | Mod: CPTII,,, | Performed by: STUDENT IN AN ORGANIZED HEALTH CARE EDUCATION/TRAINING PROGRAM

## 2024-07-24 PROCEDURE — 99214 OFFICE O/P EST MOD 30 MIN: CPT | Mod: 25,,, | Performed by: STUDENT IN AN ORGANIZED HEALTH CARE EDUCATION/TRAINING PROGRAM

## 2024-07-24 PROCEDURE — 1159F MED LIST DOCD IN RCRD: CPT | Mod: CPTII,,, | Performed by: STUDENT IN AN ORGANIZED HEALTH CARE EDUCATION/TRAINING PROGRAM

## 2024-07-24 RX ORDER — ALPRAZOLAM 0.5 MG/1
0.5 TABLET ORAL NIGHTLY PRN
Qty: 30 TABLET | Refills: 5 | Status: SHIPPED | OUTPATIENT
Start: 2024-07-24

## 2024-07-24 RX ORDER — SUMATRIPTAN SUCCINATE 25 MG/1
25 TABLET ORAL
Qty: 30 TABLET | Refills: 11 | Status: SHIPPED | OUTPATIENT
Start: 2024-07-24 | End: 2024-08-23

## 2024-07-24 RX ORDER — BUTALBITAL, ACETAMINOPHEN AND CAFFEINE 50; 325; 40 MG/1; MG/1; MG/1
1 TABLET ORAL 3 TIMES DAILY PRN
COMMUNITY
End: 2024-07-24 | Stop reason: SDUPTHER

## 2024-07-24 RX ORDER — POTASSIUM CHLORIDE 750 MG/1
10 CAPSULE, EXTENDED RELEASE ORAL DAILY
Qty: 90 CAPSULE | Refills: 3 | Status: SHIPPED | OUTPATIENT
Start: 2024-07-24

## 2024-07-24 RX ORDER — BUTALBITAL, ACETAMINOPHEN AND CAFFEINE 50; 325; 40 MG/1; MG/1; MG/1
1 TABLET ORAL 3 TIMES DAILY PRN
Qty: 90 TABLET | Refills: 3 | Status: SHIPPED | OUTPATIENT
Start: 2024-07-24

## 2024-07-24 RX ORDER — PANTOPRAZOLE SODIUM 40 MG/1
TABLET, DELAYED RELEASE ORAL
COMMUNITY
End: 2024-07-24 | Stop reason: SDUPTHER

## 2024-07-24 RX ORDER — ATENOLOL 25 MG/1
TABLET ORAL
Qty: 30 TABLET | Refills: 5 | Status: SHIPPED | OUTPATIENT
Start: 2024-07-24

## 2024-07-24 RX ORDER — LISINOPRIL AND HYDROCHLOROTHIAZIDE 12.5; 2 MG/1; MG/1
1 TABLET ORAL DAILY
Qty: 90 TABLET | Refills: 3 | Status: SHIPPED | OUTPATIENT
Start: 2024-07-24

## 2024-07-24 RX ORDER — SERTRALINE HYDROCHLORIDE 50 MG/1
50 TABLET, FILM COATED ORAL DAILY
Qty: 30 TABLET | Refills: 11 | Status: SHIPPED | OUTPATIENT
Start: 2024-07-24 | End: 2025-07-24

## 2024-07-24 RX ORDER — PANTOPRAZOLE SODIUM 40 MG/1
40 TABLET, DELAYED RELEASE ORAL DAILY
Qty: 90 TABLET | Refills: 3 | Status: SHIPPED | OUTPATIENT
Start: 2024-07-24

## 2024-07-24 RX ORDER — GABAPENTIN 300 MG/1
300 CAPSULE ORAL 2 TIMES DAILY
Qty: 60 CAPSULE | Refills: 5 | Status: SHIPPED | OUTPATIENT
Start: 2024-07-24

## 2024-07-24 NOTE — ASSESSMENT & PLAN NOTE
Recommend CBT and mindfulness therapy with biofeedback techniques and deep breathing exercises, continue current medication regimen with Xanax 0.5 mg q.d. p.r.n.

## 2024-07-24 NOTE — ASSESSMENT & PLAN NOTE
Stable  Continue current medication regimen:  Prinzide 2012.5 mg daily  Blood pressure at goal <140/90 (<130/80 if otherwise noted)  Recommend DASH diet  Avoid tobacco use  Record BP at home daily and bring log to next office visit, assure that home cuff is calibrated at minimum every 12 months

## 2024-07-24 NOTE — ASSESSMENT & PLAN NOTE
Discussed reducing caffeine intake   Continue Fioricet p.r.n.   We will add sumatriptan 25 mg p.r.n.  Obtain MRI brain since patient did not have workup previously  Also obtain temporal Doppler since headache is around temporal region

## 2024-07-30 ENCOUNTER — HOSPITAL ENCOUNTER (OUTPATIENT)
Dept: CARDIOLOGY | Facility: HOSPITAL | Age: 49
Discharge: HOME | End: 2024-07-30
Attending: STUDENT IN AN ORGANIZED HEALTH CARE EDUCATION/TRAINING PROGRAM
Payer: COMMERCIAL

## 2024-07-30 DIAGNOSIS — G43.B0 OPHTHALMOPLEGIC MIGRAINE, NOT INTRACTABLE: ICD-10-CM

## 2024-07-30 PROCEDURE — 93888 INTRACRANIAL LIMITED STUDY: CPT | Mod: TC

## 2024-07-31 ENCOUNTER — APPOINTMENT (OUTPATIENT)
Dept: RADIOLOGY | Facility: HOSPITAL | Age: 49
End: 2024-07-31
Attending: STUDENT IN AN ORGANIZED HEALTH CARE EDUCATION/TRAINING PROGRAM
Payer: COMMERCIAL

## 2024-07-31 ENCOUNTER — TELEPHONE (OUTPATIENT)
Dept: PRIMARY CARE CLINIC | Facility: CLINIC | Age: 49
End: 2024-07-31
Payer: COMMERCIAL

## 2024-07-31 DIAGNOSIS — G43.B0 OPHTHALMOPLEGIC MIGRAINE, NOT INTRACTABLE: ICD-10-CM

## 2024-07-31 DIAGNOSIS — G43.B0 OPHTHALMOPLEGIC MIGRAINE, NOT INTRACTABLE: Primary | ICD-10-CM

## 2024-07-31 PROCEDURE — 70551 MRI BRAIN STEM W/O DYE: CPT | Mod: TC

## 2024-07-31 RX ORDER — TOPIRAMATE 50 MG/1
50 TABLET, FILM COATED ORAL 2 TIMES DAILY
Qty: 60 TABLET | Refills: 11 | Status: SHIPPED | OUTPATIENT
Start: 2024-07-31 | End: 2025-07-31

## 2024-07-31 NOTE — TELEPHONE ENCOUNTER
----- Message from Allan Aguilar sent at 7/31/2024 10:35 AM CDT -----  .Type:  Needs Medical Advice    Who Called: Marilynn  Symptoms (please be specific):    How long has patient had these symptoms:    Pharmacy name and phone #:    Would the patient rather a call back or a response via [facility]?   Best Call Back Number: [phone]  Additional Information: Patient requested a call back from the nurse re: her recent visit she had a headache is not getting better. Looking to advise her on any next step.

## 2024-07-31 NOTE — TELEPHONE ENCOUNTER
Please see if pt it taking increased dose of Imitrex. She can take 100mg at once and repeat after 2 hours with maximum of 200mg a day.  I will also send Topamax for her to take twice a day to prevent migraines. If symptoms don't improve, she might have to make appt or go to ER if she has blurry vision, worse headache of her life, etc.

## 2024-07-31 NOTE — TELEPHONE ENCOUNTER
Pt states headache is still not going away, states its so painful she's in tears, cannot function unable to go to work, meds that were given are not working

## 2024-08-01 ENCOUNTER — TELEPHONE (OUTPATIENT)
Dept: PRIMARY CARE CLINIC | Facility: CLINIC | Age: 49
End: 2024-08-01
Payer: COMMERCIAL

## 2024-08-01 DIAGNOSIS — G43.B0 OPHTHALMOPLEGIC MIGRAINE, NOT INTRACTABLE: Primary | ICD-10-CM

## 2024-08-01 NOTE — TELEPHONE ENCOUNTER
----- Message from Mahogany Matilda sent at 8/1/2024  8:08 AM CDT -----  .Who Called: Marilynn Robles    Caller is requesting information on test results.    Name of Test (Lab/Mammo/Etc): mri /us  Date of Test: this week  Where the test was performed:   Ordering Provider: Gregg    Preferred Method of Contact: Phone Call  Patient's Preferred Phone Number on File: [phone]   Best Call Back Number, if different:  Additional Information: pt calling for results and also she still having headaches

## 2024-08-06 ENCOUNTER — TELEPHONE (OUTPATIENT)
Dept: PRIMARY CARE CLINIC | Facility: CLINIC | Age: 49
End: 2024-08-06
Payer: COMMERCIAL

## 2024-08-06 DIAGNOSIS — B00.9 HERPES SIMPLEX: Primary | ICD-10-CM

## 2024-08-06 RX ORDER — VALACYCLOVIR HYDROCHLORIDE 1 G/1
1000 TABLET, FILM COATED ORAL 2 TIMES DAILY
Qty: 60 TABLET | Refills: 0 | Status: SHIPPED | OUTPATIENT
Start: 2024-08-06

## 2024-08-08 ENCOUNTER — TELEPHONE (OUTPATIENT)
Dept: PRIMARY CARE CLINIC | Facility: CLINIC | Age: 49
End: 2024-08-08
Payer: COMMERCIAL

## 2024-08-08 DIAGNOSIS — G43.B0 OPHTHALMOPLEGIC MIGRAINE, NOT INTRACTABLE: Primary | ICD-10-CM

## 2024-08-29 NOTE — PROGRESS NOTES
Date: 09/06/2024  Patient ID: [id number]   Chief Complaint: Follow-up (Follow up for migraines, review mri & ultrasound, left ear pain)    HPI:   Marilynn Robles is a 49 y.o. female here today for Follow-up (Follow up for migraines, review mri & ultrasound, left ear pain)  Transcranial doppler US showed no acute findings/abnormality  MRI brain showed no acute processes, but did show few white matter hyperintensities of the left basal ganglia that is usually associated with chronic microvascular ischemia-could also be from  inflammation or migraine. She had another episode of Shingles (R head tingling and pain without rash), which she tx with Valtrex and Gabapentin. Temporal h/a and symptoms resolved after 2wks. She does have appt with neurology for eval. She denies acute complaints today including blurry vision/congestion/sob except BL ear tenderness without drainage. Also would like motion sickness patches for cruise coming up. She also completed her Shingles vaccine last week and Flu/Tetanus    Patient Active Problem List   Diagnosis    Wellness examination    DIGNA (generalized anxiety disorder)    Low potassium syndrome    Diffuse dermal angiomatosis of breast    Hypertension    Thoracic back pain    Personal history of nicotine dependence    Positive depression screening    Ophthalmoplegic migraine, not intractable    Gastroesophageal reflux disease    History of motion sickness    Acute otitis externa of both ears     Outpatient Medications Marked as Taking for the 9/6/24 encounter (Office Visit) with Jada Escobedo MD   Medication Sig Dispense Refill    albuterol (PROVENTIL/VENTOLIN HFA) 90 mcg/actuation inhaler USE 2 INHALATIONS BY MOUTH EVERY 6 HOURS AS NEEDED FOR WHEEZING 17 g 6    ALPRAZolam (XANAX) 0.5 MG tablet Take 1 tablet (0.5 mg total) by mouth nightly as needed for Anxiety. 30 tablet 5    atenoloL (TENORMIN) 25 MG tablet See Instructions, Take 1 tablet by mouth once daily, # 90 tab(s), 3  Refill(s), Pharmacy: [facility], 164, cm, Height/Length Dosing, 08/15/21 8:13:00 CDT, 65.7, kg, Weight Dosing, 08/15/21 8:13:00 CDT 30 tablet 5    butalbital-acetaminophen-caffeine -40 mg (ESGIC) -40 mg per tablet Take 1 tablet by mouth 3 (three) times daily as needed for Pain. 90 tablet 3    gabapentin (NEURONTIN) 300 MG capsule Take 1 capsule (300 mg total) by mouth 2 (two) times daily. 60 capsule 5    lisinopriL-hydrochlorothiazide (PRINZIDE,ZESTORETIC) 20-12.5 mg per tablet Take 1 tablet by mouth once daily. 90 tablet 3    pantoprazole (PROTONIX) 40 MG tablet Take 1 tablet (40 mg total) by mouth once daily. 90 tablet 3    potassium chloride (MICRO-K) 10 MEQ CpSR Take 1 capsule (10 mEq total) by mouth once daily. 90 capsule 3    sertraline (ZOLOFT) 50 MG tablet Take 1 tablet (50 mg total) by mouth once daily. 30 tablet 11    sumatriptan (IMITREX) 25 MG Tab Take 1 tablet (25 mg total) by mouth every 2 (two) hours as needed (migraine). 30 tablet 11    [DISCONTINUED] valACYclovir (VALTREX) 1000 MG tablet Take 1 tablet (1,000 mg total) by mouth 2 (two) times daily. 60 tablet 0     Past Medical History:   Diagnosis Date    Anxiety disorder, unspecified     Asymptomatic varicose veins of unspecified lower extremity     Cellulitis of right breast 10/10/2022    COVID-19 09/07/2022    Paxlovid for 5 days initiated today, Tessalon as needed for cough, ER precautions for respiratory distress despite current management with home breathing treatments as needed every 4 hours for persistent wheezing.    Diffuse dermal angiomatosis of breast 09/2022    right    Diffuse dermal angiomatosis of breast     Diverticulitis     Elevated DHEA 01/19/2023    Deescalate DHEA supplementation to 50%      Endometriosis, unspecified     HTN (hypertension)     Hypokalemia 09/07/2022    Increase daily potassium from 8 up to 10 mEq    Other specified noninfective gastroenteritis and colitis     Shingles     x9. residual  right head pain     Past Surgical History:   Procedure Laterality Date    APPENDECTOMY  1993    BREAST BIOPSY Right     hemorroid operation      SINUS SURGERY      TOTAL ABDOMINAL HYSTERECTOMY       Review of patient's allergies indicates:   Allergen Reactions    Aspirin Shortness Of Breath     Other reaction(s): severe asthma attack    Ibuprofen      Other reaction(s): severe asthma attack    Penicillins      Other reaction(s): severe asthma attack    Codeine Anxiety     Other reaction(s): makes her anxious     Family History   Problem Relation Name Age of Onset    Arthritis Mother Beatriz Senia     Hypertension Mother Beatriz Senia     Stroke Mother Beatriz Senia     Arthritis Father Sukhjinder Morrowcathyjohnkala Miranda     Stroke Maternal Grandmother Bonnie Robles     Arthritis Son Mick Hester     Arthritis Sister Alejandra Ch     Hypertension Sister Alejandra Ch     Arthritis Brother Saeid Asencio     Hypertension Brother Saeid Asencio     Arthritis Son Azeem Hester       Social History     Socioeconomic History    Marital status:    Tobacco Use    Smoking status: Every Day     Current packs/day: 1.00     Types: Cigarettes    Smokeless tobacco: Never    Tobacco comments:     In total about 15yrs smoked 1ppd   Substance and Sexual Activity    Alcohol use: Yes     Alcohol/week: 8.0 standard drinks of alcohol     Types: 8 Cans of beer per week    Drug use: Never    Sexual activity: Yes     Partners: Male     Birth control/protection: None     Social Determinants of Health     Financial Resource Strain: Low Risk  (9/3/2024)    Overall Financial Resource Strain (CARDIA)     Difficulty of Paying Living Expenses: Not very hard   Food Insecurity: No Food Insecurity (9/3/2024)    Hunger Vital Sign     Worried About Running Out of Food in the Last Year: Never true     Ran Out of Food in the Last Year: Never true   Transportation Needs: No Transportation Needs (7/24/2024)    TRANSPORTATION NEEDS     Transportation : No  "  Physical Activity: Inactive (9/3/2024)    Exercise Vital Sign     Days of Exercise per Week: 0 days     Minutes of Exercise per Session: 0 min   Stress: Stress Concern Present (9/3/2024)    [employer] [place] [employer] - Occupational Stress Questionnaire     Feeling of Stress : Very much   Housing Stability: Low Risk  (9/3/2024)    Housing Stability Vital Sign     Unable to Pay for Housing in the Last Year: No     Homeless in the Last Year: No     Patient Care Team:  Jada Escobedo MD as PCP - General (Family Medicine)  Fab Bedoya MD as Consulting Physician (Gastroenterology)  Allan Pearce MD (Plastic Surgery)  Eagle Larios DO (Surgery)  Isaiah Brooks MD as Consulting Physician (Cardiothoracic Surgery)  Ivan Menon Jr., MD as Consulting Physician (Obstetrics and Gynecology)  Kirti Rubio MD (Dermatology)   Subjective:   ROS  See HPI for details  All Other ROS: Negative except as stated in HPI.   Objective:   /75   Pulse 67   Ht 5' 4" (1.626 m)   Wt 72.5 kg (159 lb 14.4 oz)   SpO2 98%   BMI 27.45 kg/m²   Physical Exam  Constitutional:       Appearance: Normal appearance.   HENT:      Right Ear: Tympanic membrane normal. There is no impacted cerumen.      Left Ear: Tympanic membrane normal. There is no impacted cerumen.      Ears:      Comments: Mild erythema without drainage BL canals  Pulmonary:      Effort: Pulmonary effort is normal.   Neurological:      Mental Status: She is alert and oriented to person, place, and time.       Assessment:       ICD-10-CM ICD-9-CM   1. Ophthalmoplegic migraine, not intractable  G43.B0 346.20   2. Acute otitis externa of both ears, unspecified type  H60.503 380.10   3. History of motion sickness  Z87.898 V13.89      Plan:   1. Ophthalmoplegic migraine, not intractable  Assessment & Plan:  Resolved  Likely from Shingles  Continue to monitor  F/u with neurology       2. Acute otitis externa of both ears, unspecified " type  Assessment & Plan:  Ear canal suctioning  Due not put anything in ear. Avoid flushing ear  Dry canal with hair dryer on cool setting after bathing  Oral OTC analgesics for pain  Consider topical analgesic: Tetracaine or Lidocaine drops  Before/after swimming apply white vinegar/(2% acetic acid) 1/3 in rubbing alcohol 2/3  OR Burow's solution in Star-Otic   Vosol HC or Otic [place] tid  Antibiotics drops: Cortisporin bid x 5-7d      Orders:  -     neomycin-polymyxin-hydrocortisone (CORTISPORIN) 3.5-10,000-1 mg/mL-unit/mL-% otic suspension; Place 3 drops into the left ear 3 (three) times daily.  Dispense: 10 mL; Refill: 0    3. History of motion sickness  Assessment & Plan:  Rx Scopolamine patches for cruise    Orders:  -     scopolamine (TRANSDERM-SCOP) 1.3-1.5 mg (1 mg over 3 days); Place 1 patch onto the skin every 72 hours.  Dispense: 4 patch; Refill: 0         Medication List with Changes/Refills   New Medications    NEOMYCIN-POLYMYXIN-HYDROCORTISONE (CORTISPORIN) 3.5-10,000-1 MG/ML-UNIT/ML-% OTIC SUSPENSION    Place 3 drops into the left ear 3 (three) times daily.       Start Date: 9/6/2024  End Date: --    SCOPOLAMINE (TRANSDERM-SCOP) 1.3-1.5 MG (1 MG OVER 3 DAYS)    Place 1 patch onto the skin every 72 hours.       Start Date: 9/6/2024  End Date: --   Current Medications    ALBUTEROL (PROVENTIL/VENTOLIN HFA) 90 MCG/ACTUATION INHALER    USE 2 INHALATIONS BY MOUTH EVERY 6 HOURS AS NEEDED FOR WHEEZING       Start Date: 3/23/2023 End Date: --    ALPRAZOLAM (XANAX) 0.5 MG TABLET    Take 1 tablet (0.5 mg total) by mouth nightly as needed for Anxiety.       Start Date: 7/24/2024 End Date: --    ATENOLOL (TENORMIN) 25 MG TABLET    See Instructions, Take 1 tablet by mouth once daily, # 90 tab(s), 3 Refill(s), Pharmacy: [facility], 164, cm, Height/Length Dosing, 08/15/21 8:13:00 CDT, 65.7, kg, Weight Dosing, 08/15/21 8:13:00 CDT       Start Date: 7/24/2024 End Date: --    BUDESONIDE-FORMOTEROL 80-4.5  MCG (SYMBICORT) 80-4.5 MCG/ACTUATION HFAA    Inhale into the lungs.       Start Date: 8/13/2021 End Date: --    BUPROPION (WELLBUTRIN SR) 150 MG TBSR 12 HR TABLET    Take 1 tablet (150 mg total) by mouth 2 (two) times daily.       Start Date: 7/20/2023 End Date: 7/19/2024    BUTALBITAL-ACETAMINOPHEN-CAFFEINE -40 MG (ESGIC) -40 MG PER TABLET    Take 1 tablet by mouth 3 (three) times daily as needed for Pain.       Start Date: 7/24/2024 End Date: --    GABAPENTIN (NEURONTIN) 300 MG CAPSULE    Take 1 capsule (300 mg total) by mouth 2 (two) times daily.       Start Date: 7/24/2024 End Date: --    LISINOPRIL-HYDROCHLOROTHIAZIDE (PRINZIDE,ZESTORETIC) 20-12.5 MG PER TABLET    Take 1 tablet by mouth once daily.       Start Date: 7/24/2024 End Date: --    NICOTINE (NICODERM CQ) 14 MG/24 HR    Place 1 patch onto the skin once daily.       Start Date: 8/1/2023  End Date: --    NICOTINE (NICODERM CQ) 21 MG/24 HR    Place 1 patch onto the skin once daily.       Start Date: 6/28/2023 End Date: --    NICOTINE (NICODERM CQ) 7 MG/24 HR    Place 1 patch onto the skin once daily.       Start Date: 8/1/2023  End Date: --    NIFEDIPINE, BULK, MISC    APPLY UP TO 1 GRAM TO THE INTERNAL AND EXTERNAL ANUS TWICE DAILY AS DIRECTED.       Start Date: 10/5/2022 End Date: --    PANTOPRAZOLE (PROTONIX) 40 MG TABLET    Take 1 tablet (40 mg total) by mouth once daily.       Start Date: 7/24/2024 End Date: --    POTASSIUM CHLORIDE (MICRO-K) 10 MEQ CPSR    Take 1 capsule (10 mEq total) by mouth once daily.       Start Date: 7/24/2024 End Date: --    SERTRALINE (ZOLOFT) 50 MG TABLET    Take 1 tablet (50 mg total) by mouth once daily.       Start Date: 7/24/2024 End Date: 7/24/2025    SUMATRIPTAN (IMITREX) 25 MG TAB    Take 1 tablet (25 mg total) by mouth every 2 (two) hours as needed (migraine).       Start Date: 7/24/2024 End Date: 9/6/2024    TOPIRAMATE (TOPAMAX) 50 MG TABLET    Take 1 tablet (50 mg total) by mouth 2 (two) times daily.        Start Date: 7/31/2024 End Date: 7/31/2025   Changed and/or Refilled Medications    Modified Medication Previous Medication    VALACYCLOVIR (VALTREX) 1000 MG TABLET valACYclovir (VALTREX) 1000 MG tablet       Take 1 tablet (1,000 mg total) by mouth 2 (two) times daily.    Take 1 tablet (1,000 mg total) by mouth 2 (two) times daily.       Start Date: 9/6/2024  End Date: --    Start Date: 8/6/2024  End Date: 9/6/2024        Follow up in about 7 months (around 4/6/2025) for Anxiety, Telemed, HTN, Med Refill. In addition to their scheduled follow up, the patient has also been instructed to follow up on as needed basis.

## 2024-09-06 ENCOUNTER — OFFICE VISIT (OUTPATIENT)
Dept: PRIMARY CARE CLINIC | Facility: CLINIC | Age: 49
End: 2024-09-06
Payer: COMMERCIAL

## 2024-09-06 VITALS
SYSTOLIC BLOOD PRESSURE: 112 MMHG | DIASTOLIC BLOOD PRESSURE: 75 MMHG | OXYGEN SATURATION: 98 % | HEIGHT: 64 IN | HEART RATE: 67 BPM | WEIGHT: 159.88 LBS | BODY MASS INDEX: 27.29 KG/M2

## 2024-09-06 DIAGNOSIS — G43.B0 OPHTHALMOPLEGIC MIGRAINE, NOT INTRACTABLE: Primary | ICD-10-CM

## 2024-09-06 DIAGNOSIS — H60.503 ACUTE OTITIS EXTERNA OF BOTH EARS, UNSPECIFIED TYPE: ICD-10-CM

## 2024-09-06 DIAGNOSIS — B00.9 HERPES SIMPLEX: ICD-10-CM

## 2024-09-06 DIAGNOSIS — Z87.898 HISTORY OF MOTION SICKNESS: ICD-10-CM

## 2024-09-06 RX ORDER — NEOMYCIN SULFATE, POLYMYXIN B SULFATE AND HYDROCORTISONE 10; 3.5; 1 MG/ML; MG/ML; [USP'U]/ML
3 SUSPENSION/ DROPS AURICULAR (OTIC) 3 TIMES DAILY
Qty: 10 ML | Refills: 0 | Status: SHIPPED | OUTPATIENT
Start: 2024-09-06

## 2024-09-06 RX ORDER — SCOLOPAMINE TRANSDERMAL SYSTEM 1 MG/1
1 PATCH, EXTENDED RELEASE TRANSDERMAL
Qty: 4 PATCH | Refills: 0 | Status: SHIPPED | OUTPATIENT
Start: 2024-09-06

## 2024-09-06 RX ORDER — VALACYCLOVIR HYDROCHLORIDE 1 G/1
1000 TABLET, FILM COATED ORAL 2 TIMES DAILY
Qty: 60 TABLET | Refills: 0 | Status: SHIPPED | OUTPATIENT
Start: 2024-09-06

## 2024-09-06 NOTE — ASSESSMENT & PLAN NOTE
Ear canal suctioning  Due not put anything in ear. Avoid flushing ear  Dry canal with hair dryer on cool setting after bathing  Oral OTC analgesics for pain  Consider topical analgesic: Tetracaine or Lidocaine drops  Before/after swimming apply white vinegar/(2% acetic acid) 1/3 in rubbing alcohol 2/3  OR Burow's solution in Star-Otic   Vosol HC or Otic [place] tid  Antibiotics drops: Cortisporin bid x 5-7d

## 2024-10-21 PROBLEM — Z00.00 WELLNESS EXAMINATION: Status: RESOLVED | Noted: 2022-06-01 | Resolved: 2024-10-21

## 2024-11-04 ENCOUNTER — OFFICE VISIT (OUTPATIENT)
Dept: NEUROLOGY | Facility: CLINIC | Age: 49
End: 2024-11-04
Payer: COMMERCIAL

## 2024-11-04 VITALS
DIASTOLIC BLOOD PRESSURE: 76 MMHG | HEIGHT: 64 IN | BODY MASS INDEX: 27.31 KG/M2 | WEIGHT: 160 LBS | SYSTOLIC BLOOD PRESSURE: 118 MMHG

## 2024-11-04 DIAGNOSIS — G43.B0 OPHTHALMOPLEGIC MIGRAINE, NOT INTRACTABLE: ICD-10-CM

## 2024-11-04 DIAGNOSIS — G47.33 OSA (OBSTRUCTIVE SLEEP APNEA): ICD-10-CM

## 2024-11-04 DIAGNOSIS — M79.2 NEURALGIA: Primary | ICD-10-CM

## 2024-11-04 DIAGNOSIS — G47.19 EXCESSIVE DAYTIME SLEEPINESS: ICD-10-CM

## 2024-11-04 DIAGNOSIS — G43.109 MIGRAINE WITH AURA AND WITHOUT STATUS MIGRAINOSUS, NOT INTRACTABLE: ICD-10-CM

## 2024-11-04 PROCEDURE — 3074F SYST BP LT 130 MM HG: CPT | Mod: CPTII,S$GLB,, | Performed by: NURSE PRACTITIONER

## 2024-11-04 PROCEDURE — 3008F BODY MASS INDEX DOCD: CPT | Mod: CPTII,S$GLB,, | Performed by: NURSE PRACTITIONER

## 2024-11-04 PROCEDURE — 3078F DIAST BP <80 MM HG: CPT | Mod: CPTII,S$GLB,, | Performed by: NURSE PRACTITIONER

## 2024-11-04 PROCEDURE — 1160F RVW MEDS BY RX/DR IN RCRD: CPT | Mod: CPTII,S$GLB,, | Performed by: NURSE PRACTITIONER

## 2024-11-04 PROCEDURE — 1159F MED LIST DOCD IN RCRD: CPT | Mod: CPTII,S$GLB,, | Performed by: NURSE PRACTITIONER

## 2024-11-04 PROCEDURE — 99205 OFFICE O/P NEW HI 60 MIN: CPT | Mod: S$GLB,,, | Performed by: NURSE PRACTITIONER

## 2024-11-04 PROCEDURE — 99999 PR PBB SHADOW E&M-EST. PATIENT-LVL IV: CPT | Mod: PBBFAC,,, | Performed by: NURSE PRACTITIONER

## 2024-11-04 PROCEDURE — 4010F ACE/ARB THERAPY RXD/TAKEN: CPT | Mod: CPTII,S$GLB,, | Performed by: NURSE PRACTITIONER

## 2024-11-04 RX ORDER — DULOXETIN HYDROCHLORIDE 30 MG/1
60 CAPSULE, DELAYED RELEASE ORAL DAILY
Qty: 60 CAPSULE | Refills: 11 | Status: SHIPPED | OUTPATIENT
Start: 2024-11-04

## 2024-11-04 NOTE — PROGRESS NOTES
"  Neurology Note - Initial Encounter    Subjective:         Patient ID: Marilynn Robles is a 49 y.o. female.    Chief Complaint: Pt ref by Dr Escobedo for migraines and temporal pain     HPI:            Dx with migraines "few years ago" - migraines have been stable; last known migraine was July 2024. Starts with tunnel vision and changes in sounds "like a cartoon fading in and out" followed by a bad migraine. Topiramate in the past was not tolerable    Reason for visit today is constant R temporal pain and now intermittent L temporal pain. R temporal pain started 03/2024 following shingle infection on her R scalp - sharp pains, lasting seconds then resolving - was occurring intermittently but has not become constant and does not let up. Recently, the L tempal has started with sharp pain but is intermittent. Both are sensitive to touch and keeps her up at night when her head touches the pillow.     Recent eye exam was "ok, nothing worrisome"    Pt states that there's a constant dull paint & 20-30 second intervals of random stabbing pain.     Denies diplopia or eye pain    The Gabapentin at bed time does help some.     MRI brain w/o 07/2024: rad "No acute intracranial abnormality is visualized. Very few periventricular/deep white matter left basal ganglia T2/FLAIR hyperintensities" - we agree     She has excessive daytime sleepiness and insomnia. No prior SS.          11/4/2024    12:04 PM   EPWORTH SLEEPINESS SCALE   Sitting and reading 1   Watching TV 1   Sitting, inactive in a public place (e.g. a theatre or a meeting) 1   As a passenger in a car for an hour without a break 1   Lying down to rest in the afternoon when circumstances permit 3   Sitting and talking to someone 1   Sitting quietly after a lunch without alcohol 1   In a car, while stopped for a few minutes in traffic 1   Total score 10       ROS: as per HPI, otherwise pertinent systems review is negative          Past Medical History:   Diagnosis " Date    Anxiety disorder, unspecified     Asymptomatic varicose veins of unspecified lower extremity     Cellulitis of right breast 10/10/2022    COVID-19 09/07/2022    Paxlovid for 5 days initiated today, Tessalon as needed for cough, ER precautions for respiratory distress despite current management with home breathing treatments as needed every 4 hours for persistent wheezing.    Diffuse dermal angiomatosis of breast 09/2022    right    Diffuse dermal angiomatosis of breast     Diverticulitis     Elevated DHEA 01/19/2023    Deescalate DHEA supplementation to 50%      Endometriosis, unspecified     HTN (hypertension)     Hypokalemia 09/07/2022    Increase daily potassium from 8 up to 10 mEq    Other specified noninfective gastroenteritis and colitis     Shingles     x9. residual right head pain       Past Surgical History:   Procedure Laterality Date    APPENDECTOMY  1993    BREAST BIOPSY Right     hemorroid operation      SINUS SURGERY      TOTAL ABDOMINAL HYSTERECTOMY         Family History   Problem Relation Name Age of Onset    Arthritis Mother Beatriz Senia     Hypertension Mother Beatriz Senia     Stroke Mother Beatriz Senia     Arthritis Father Sukhjinder Robles Jr.     Stroke Maternal Grandmother Bonnie Mcintyrekala Robles     Arthritis Son Mick Hester     Arthritis Sister Alejandra Ch     Hypertension Sister Alejandra Ch     Arthritis Brother Saeid Asencio     Hypertension Brother Saeid Asencio     Arthritis Son Azeem Hester        Social History     Socioeconomic History    Marital status:    Tobacco Use    Smoking status: Every Day     Current packs/day: 1.00     Types: Cigarettes    Smokeless tobacco: Never    Tobacco comments:     In total about 15yrs smoked 1ppd   Substance and Sexual Activity    Alcohol use: Yes     Alcohol/week: 8.0 standard drinks of alcohol     Types: 8 Cans of beer per week    Drug use: Never    Sexual activity: Yes     Partners: Male     Birth control/protection: None      Social Drivers of Health     Financial Resource Strain: Low Risk  (9/3/2024)    Overall Financial Resource Strain (CARDIA)     Difficulty of Paying Living Expenses: Not very hard   Food Insecurity: No Food Insecurity (9/3/2024)    Hunger Vital Sign     Worried About Running Out of Food in the Last Year: Never true     Ran Out of Food in the Last Year: Never true   Transportation Needs: No Transportation Needs (7/24/2024)    TRANSPORTATION NEEDS     Transportation : No   Physical Activity: Inactive (9/3/2024)    Exercise Vital Sign     Days of Exercise per Week: 0 days     Minutes of Exercise per Session: 0 min   Stress: Stress Concern Present (9/3/2024)    [employer] [place] [employer] - Occupational Stress Questionnaire     Feeling of Stress : Very much   Housing Stability: Low Risk  (9/3/2024)    Housing Stability Vital Sign     Unable to Pay for Housing in the Last Year: No     Homeless in the Last Year: No       Review of patient's allergies indicates:   Allergen Reactions    Aspirin Shortness Of Breath     Other reaction(s): severe asthma attack    Ibuprofen      Other reaction(s): severe asthma attack    Penicillins      Other reaction(s): severe asthma attack    Codeine Anxiety     Other reaction(s): makes her anxious       Current Outpatient Medications   Medication Instructions    albuterol (PROVENTIL/VENTOLIN HFA) 90 mcg/actuation inhaler USE 2 INHALATIONS BY MOUTH EVERY 6 HOURS AS NEEDED FOR WHEEZING    ALPRAZolam (XANAX) 0.5 mg, Oral, Nightly PRN    atenoloL (TENORMIN) 25 MG tablet See Instructions, Take 1 tablet by mouth once daily, # 90 tab(s), 3 Refill(s), Pharmacy: [facility], 164, cm, Height/Length Dosing, 08/15/21 8:13:00 CDT, 65.7, kg, Weight Dosing, 08/15/21 8:13:00 CDT    butalbital-acetaminophen-caffeine -40 mg (ESGIC) -40 mg per tablet 1 tablet, Oral, 3 times daily PRN    gabapentin (NEURONTIN) 300 mg, Oral, 2 times daily    lisinopriL-hydrochlorothiazide  "(PRINZIDE,ZESTORETIC) 20-12.5 mg per tablet 1 tablet, Oral, Daily    pantoprazole (PROTONIX) 40 mg, Oral, Daily    potassium chloride (MICRO-K) 10 MEQ CpSR 10 mEq, Oral, Daily    valACYclovir (VALTREX) 1,000 mg, Oral, 2 times daily         Objective:      Exam:   /76 (BP Location: Left arm, Patient Position: Sitting)   Ht 5' 4" (1.626 m)   Wt 72.6 kg (160 lb)   BMI 27.46 kg/m²     Physical Exam  Vitals reviewed.   Constitutional:       Appearance: Normal appearance.      Accompanied by: alone   HENT:      Ears:      Comments: Hearing normal.     Mallampati: 4     Neck Circumference: 15"  Eyes:      Extraocular Movements: Extraocular movements intact.      VF's ok     Funduscopic exam: disc margins crisp  Cardiovascular:      Rate and Rhythm: Normal rate and regular rhythm.   Pulmonary:      Effort: Pulmonary effort is normal.      Breath sounds: Normal breath sounds.   Musculoskeletal:         General: Normal range of motion.   Skin:     General: Skin is warm and dry.      I did not appreciate any "lumps" behind her ears  Neurological:      General: No focal deficit present.      Mental Status: alert and oriented to person, place, and time.      Speech: nml     Face: symmetric; tongue midline; cheek puff nml     Motor: nonlateralizing     Coordination: F to N ok, no dysmetria     Reflexes:  brisk symmetric B biceps and KJ's, 2+ B AJ's     Sensation: Vib nml     Tone: No ankle clonus     Tremor: none     Gait: unassisted and normal. Tip toe/heel/tandem ok   Psychiatric:         Mood and Affect: Mood normal. Tearful at times       Behavior: Behavior normal.     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   MRI brain was not worrisome " "for acute findings.    Transcranial doppler unrevealing    Increase Gabapentin to 300 mg TID    Transition Zoloft to Duloxetine:  Week 1: half tab Zoloft with 30 mg Duloxetine daily  Week 2 and beyond: stop the Zoloft and increase the Duloxetine to 60 mg daily     Check CRP/ESR    HST; if JACKIE, autopap. Lengthy discussion about the risks of untreated sleep apnea. Testing for sleep apnea discussed. Potential need for treatment of JACKIE discussed including CPAP or Bilevel PAP. Alternatives to PAP discussed if PAP not ultimately tolerated. Risks of drowsy driving discussed.    I ask that she reach out to PCP regarding any "lumps" behind her ear; I did not appreciate any lumps when I palpated.     Follow up TBD - after HST completed            Guicho Purvis, MSN, APRN, AGACNP-BC        "

## 2024-11-05 ENCOUNTER — LAB VISIT (OUTPATIENT)
Dept: LAB | Facility: HOSPITAL | Age: 49
End: 2024-11-05
Attending: NURSE PRACTITIONER
Payer: COMMERCIAL

## 2024-11-05 DIAGNOSIS — M79.2 NEURALGIA: ICD-10-CM

## 2024-11-05 LAB
CRP SERPL-MCNC: 17.9 MG/L
ERYTHROCYTE [SEDIMENTATION RATE] IN BLOOD: 16 MM/HR (ref 0–20)

## 2024-11-05 PROCEDURE — 36415 COLL VENOUS BLD VENIPUNCTURE: CPT

## 2024-11-05 PROCEDURE — 85652 RBC SED RATE AUTOMATED: CPT

## 2024-11-05 PROCEDURE — 86140 C-REACTIVE PROTEIN: CPT

## 2024-11-06 ENCOUNTER — PROCEDURE VISIT (OUTPATIENT)
Dept: SLEEP MEDICINE | Facility: HOSPITAL | Age: 49
End: 2024-11-06
Attending: NURSE PRACTITIONER
Payer: COMMERCIAL

## 2024-11-06 DIAGNOSIS — G47.19 EXCESSIVE DAYTIME SLEEPINESS: ICD-10-CM

## 2024-11-06 DIAGNOSIS — G43.109 MIGRAINE WITH AURA AND WITHOUT STATUS MIGRAINOSUS, NOT INTRACTABLE: ICD-10-CM

## 2024-11-06 DIAGNOSIS — G47.33 OSA (OBSTRUCTIVE SLEEP APNEA): ICD-10-CM

## 2024-11-06 PROCEDURE — 95806 SLEEP STUDY UNATT&RESP EFFT: CPT

## 2024-11-08 ENCOUNTER — PATIENT MESSAGE (OUTPATIENT)
Dept: NEUROLOGY | Facility: CLINIC | Age: 49
End: 2024-11-08
Payer: COMMERCIAL

## 2024-11-12 DIAGNOSIS — K64.9 HEMORRHOIDS: Primary | ICD-10-CM

## 2024-11-14 ENCOUNTER — TELEPHONE (OUTPATIENT)
Dept: NEUROLOGY | Facility: CLINIC | Age: 49
End: 2024-11-14
Payer: COMMERCIAL

## 2024-11-14 ENCOUNTER — OFFICE VISIT (OUTPATIENT)
Dept: SURGICAL ONCOLOGY | Facility: CLINIC | Age: 49
End: 2024-11-14
Payer: COMMERCIAL

## 2024-11-14 ENCOUNTER — PATIENT MESSAGE (OUTPATIENT)
Dept: NEUROLOGY | Facility: CLINIC | Age: 49
End: 2024-11-14
Payer: COMMERCIAL

## 2024-11-14 VITALS
WEIGHT: 162 LBS | HEIGHT: 65 IN | DIASTOLIC BLOOD PRESSURE: 74 MMHG | BODY MASS INDEX: 26.99 KG/M2 | HEART RATE: 75 BPM | SYSTOLIC BLOOD PRESSURE: 106 MMHG

## 2024-11-14 DIAGNOSIS — K64.2 GRADE III HEMORRHOIDS: ICD-10-CM

## 2024-11-14 PROCEDURE — 99204 OFFICE O/P NEW MOD 45 MIN: CPT | Mod: S$GLB,,,

## 2024-11-14 PROCEDURE — 3074F SYST BP LT 130 MM HG: CPT | Mod: CPTII,S$GLB,,

## 2024-11-14 PROCEDURE — 3078F DIAST BP <80 MM HG: CPT | Mod: CPTII,S$GLB,,

## 2024-11-14 PROCEDURE — 99999 PR PBB SHADOW E&M-EST. PATIENT-LVL IV: CPT | Mod: PBBFAC,,,

## 2024-11-14 PROCEDURE — 4010F ACE/ARB THERAPY RXD/TAKEN: CPT | Mod: CPTII,S$GLB,,

## 2024-11-14 PROCEDURE — 1159F MED LIST DOCD IN RCRD: CPT | Mod: CPTII,S$GLB,,

## 2024-11-14 PROCEDURE — 1160F RVW MEDS BY RX/DR IN RCRD: CPT | Mod: CPTII,S$GLB,,

## 2024-11-14 PROCEDURE — 3008F BODY MASS INDEX DOCD: CPT | Mod: CPTII,S$GLB,,

## 2024-11-14 NOTE — PROGRESS NOTES
Colorectal Surgery  Patient ID: [id number]     Chief Complaint: Hemorrhoids      HPI:     Marilynn Robles is a 49 y.o. female here today for an initial consultation after referred by Dr. Fab Bedoya for hemorrhoids. She reports prolapsing hemorrhoids with bowel movements over the past 26 years. She had internal hemorrhoids banded in 2016 and 2021 and also thrombosed hemorrhoid lanced in 2022. She states she has intermittent rectal pain and bleeding with bowel movements. Her last colonoscopy was normal and she is due for another, but was unable to tolerate the colon prep due to her hemorrhoids.    Past Medical History:  has a past medical history of Anxiety disorder, unspecified, Asymptomatic varicose veins of unspecified lower extremity, Cellulitis of right breast, COVID-19, Diffuse dermal angiomatosis of breast, Diffuse dermal angiomatosis of breast, Diverticulitis, Elevated DHEA, Endometriosis, unspecified, HTN (hypertension), Hypokalemia, Other specified noninfective gastroenteritis and colitis, Shingles, and Thrombosed hemorrhoids.    Surgical History:  has a past surgical history that includes hemorroid operation; Total abdominal hysterectomy; Sinus surgery; Breast biopsy (Right); Appendectomy (1993); and BANDING LIGATION (11/14/2019).    Family History: family history includes Arthritis in her brother, father, mother, sister, son, and son; Hypertension in her brother, mother, and sister; Stroke in her maternal grandmother and mother.    Social History:  reports that she has been smoking cigarettes. She has never used smokeless tobacco. She reports current alcohol use of about 8.0 standard drinks of alcohol per week. She reports that she does not use drugs.    Current Outpatient Medications   Medication Instructions    albuterol (PROVENTIL/VENTOLIN HFA) 90 mcg/actuation inhaler USE 2 INHALATIONS BY MOUTH EVERY 6 HOURS AS NEEDED FOR WHEEZING    ALPRAZolam (XANAX) 0.5 mg, Oral, Nightly PRN    atenoloL  "(TENORMIN) 25 MG tablet See Instructions, Take 1 tablet by mouth once daily, # 90 tab(s), 3 Refill(s), Pharmacy: [facility], 164, cm, Height/Length Dosing, 08/15/21 8:13:00 CDT, 65.7, kg, Weight Dosing, 08/15/21 8:13:00 CDT    butalbital-acetaminophen-caffeine -40 mg (ESGIC) -40 mg per tablet 1 tablet, Oral, 3 times daily PRN    DULoxetine (CYMBALTA) 60 mg, Oral, Daily, 1 cap daily for seven days then 2 caps daily thereafter    gabapentin (NEURONTIN) 300 mg, Oral, 2 times daily    lisinopriL-hydrochlorothiazide (PRINZIDE,ZESTORETIC) 20-12.5 mg per tablet 1 tablet, Oral, Daily    pantoprazole (PROTONIX) 40 mg, Oral, Daily    potassium chloride (MICRO-K) 10 MEQ CpSR 10 mEq, Oral, Daily    valACYclovir (VALTREX) 1,000 mg, Oral, 2 times daily       Patient is allergic to aspirin, ibuprofen, penicillins, and codeine.     Patient Care Team:  Jada Escobedo MD as PCP - General (Family Medicine)  Fab Bedoya MD as Consulting Physician (Gastroenterology)  Allan Pearce MD (Plastic Surgery)  Eagle Larios DO (Surgery)  Isaiah Brooks MD as Consulting Physician (Cardiothoracic Surgery)  Ivan Menon Jr., MD as Consulting Physician (Obstetrics and Gynecology)  Kirti Rubio MD (Dermatology)     Subjective:     Review of Systems    12 point review of systems conducted, negative except as stated in the history of present illness. See HPI for details.    Objective:     Visit Vitals  /74 (BP Location: Left arm, Patient Position: Sitting)   Pulse 75   Ht 5' 5" (1.651 m)   Wt 73.5 kg (162 lb)   SpO2 (P) 97%   BMI 26.96 kg/m²       Physical Exam    General: Alert and oriented, No acute distress.  Head: Normocephalic, Atraumatic.  Eye: Pupils are equal and round, Extraocular movements are intact, Sclera non-icteric.  Ears/Nose/Throat: Normal, Mucosa moist, Clear.  Respiratory: No wheezes, Non-labored respirations, Symmetrical chest wall expansion.  Cardiovascular: Regular " rate.  Gastrointestinal: Soft, Non-tender, Non-distended, Normal bowel sounds, No palpable masses.  Rectal: left posterior healing thrombosed hemorrhoid, anterior grade III hemorrhoid.   Integumentary: Warm, Dry, Intact, No rashes.  Extremities: No edema.  Neurologic: No focal deficits.  Psychiatric: Normal interaction, Coherent speech, Euthymic mood, Appropriate affect.     Labs Reviewed:     Chemistry:  Lab Results   Component Value Date     07/19/2024    K 3.6 07/19/2024    BUN 10.1 07/19/2024    CREATININE 0.89 07/19/2024    EGFRNORACEVR >60 07/19/2024    GLUCOSE 94 07/19/2024    CALCIUM 9.7 07/19/2024    ALKPHOS 126 07/19/2024    LABPROT 6.4 07/19/2024    ALBUMIN 3.8 07/19/2024    BILIDIR 0.3 12/27/2021    IBILI 0.50 12/27/2021    AST 14 07/19/2024    ALT 16 07/19/2024        Hematology:  Lab Results   Component Value Date    WBC 7.67 07/19/2024    HGB 13.5 07/19/2024    HCT 40.8 07/19/2024     07/19/2024       Assessment:       ICD-10-CM ICD-9-CM   1. Grade III hemorrhoids  K64.2 455.0        Plan:     1. Grade III hemorrhoids  - Schedule hemorrhoidectomy for 1/6/25  - I explained the risks and benefits of the procedure in depth. All questions were answered thoroughly.     No follow-ups on file. In addition to their scheduled follow up, the patient has also been instructed to follow up on as needed basis.     Future Appointments   Date Time Provider Department Center   11/21/2024  8:00 AM Guicho Purvis AGACNP-BC [facility] [address] [facility]   12/23/2024  2:00 PM [facility]   3/13/2025 11:00 AM Jada Escobedo MD [facility]

## 2024-11-21 ENCOUNTER — PATIENT MESSAGE (OUTPATIENT)
Dept: NEUROLOGY | Facility: CLINIC | Age: 49
End: 2024-11-21

## 2024-11-21 ENCOUNTER — OFFICE VISIT (OUTPATIENT)
Dept: NEUROLOGY | Facility: CLINIC | Age: 49
End: 2024-11-21
Payer: COMMERCIAL

## 2024-11-21 DIAGNOSIS — G43.109 MIGRAINE WITH AURA AND WITHOUT STATUS MIGRAINOSUS, NOT INTRACTABLE: Primary | ICD-10-CM

## 2024-11-21 DIAGNOSIS — M79.2 NEURALGIA: ICD-10-CM

## 2024-11-21 PROCEDURE — 1160F RVW MEDS BY RX/DR IN RCRD: CPT | Mod: CPTII,95,, | Performed by: NURSE PRACTITIONER

## 2024-11-21 PROCEDURE — 99214 OFFICE O/P EST MOD 30 MIN: CPT | Mod: 95,,, | Performed by: NURSE PRACTITIONER

## 2024-11-21 PROCEDURE — 1159F MED LIST DOCD IN RCRD: CPT | Mod: CPTII,95,, | Performed by: NURSE PRACTITIONER

## 2024-11-21 PROCEDURE — 4010F ACE/ARB THERAPY RXD/TAKEN: CPT | Mod: CPTII,95,, | Performed by: NURSE PRACTITIONER

## 2024-11-21 NOTE — PROGRESS NOTES
Virtual Visit Note    Subjective:          Patient ID: Marilynn Robles is a 49 y.o. female.    Chief Complaint: HST results, headaches    HPI:           The patient location is: vehicle   Visit type: audiovisual    HST:  DONY 3  Jimbo 85%  <88% 24 minutes   Discrepant features on pulse oximeter    Increased Gabapentin to 300 mg TID, most nights takes 600 mg and she has transitioned to Duloxetine, now 60 mg daily. Overall, head pain has improved.      This is a telemedicine note. After obtaining verbal consent/patient identification using name and , patient was treated using real time audio/video, according to [facility] protocols. Guicho BROOKS NP [distant provider], conducted the visit from location identified below. The patient participated in the visit at a non-[facility] location selected by the patient (or patients representative), identified below. I am licensed in the state where the patient stated they are located. The patient (or patients representative) stated that they understood and accepted the privacy and security risks to their information at their location.    Distant provider was located at [facility]    Each patient to whom he or she provides medical services by telemedicine is:  (1) informed of the relationship between the physician and patient and the respective role of any other health care provider with respect to management of the patient; and (2) notified that he or she may decline to receive medical services by telemedicine and may withdraw from such care at any time.          Past Medical History:   Diagnosis Date    Anxiety disorder, unspecified     Asymptomatic varicose veins of unspecified lower extremity     Cellulitis of right breast 10/10/2022    COVID-19 2022    Paxlovid for 5 days initiated today, Tessalon as needed for cough, ER precautions for respiratory distress despite current management with home breathing treatments as needed every 4 hours  for persistent wheezing.    Diffuse dermal angiomatosis of breast 09/2022    right    Diffuse dermal angiomatosis of breast     Diverticulitis     Elevated DHEA 01/19/2023    Deescalate DHEA supplementation to 50%      Endometriosis, unspecified     HTN (hypertension)     Hypokalemia 09/07/2022    Increase daily potassium from 8 up to 10 mEq    Other specified noninfective gastroenteritis and colitis     Shingles     x9. residual right head pain    Thrombosed hemorrhoids 10/05/2022       Past Surgical History:   Procedure Laterality Date    APPENDECTOMY  1993    BANDING LIGATION  11/14/2019 11/18/19,12/9/19,12/27/21    BREAST BIOPSY Right     hemorroid operation      SINUS SURGERY      TOTAL ABDOMINAL HYSTERECTOMY         Family History   Problem Relation Name Age of Onset    Arthritis Mother Beatriz Senia     Hypertension Mother Beatriz Senia     Stroke Mother Beatriz Senia     Arthritis Father Sukhjinder Robles Jr.     Stroke Maternal Grandmother Bonnie Lee Travis     Arthritis Son Mick Hester     Arthritis Sister Alejandra Ch     Hypertension Sister Alejandra Ch     Arthritis Brother Saeid Asencio     Hypertension Brother Saeid Asencio     Arthritis Son Azeem Hester        Social History     Socioeconomic History    Marital status:    Tobacco Use    Smoking status: Every Day     Current packs/day: 1.00     Types: Cigarettes    Smokeless tobacco: Never    Tobacco comments:     In total about 15yrs smoked 1ppd   Substance and Sexual Activity    Alcohol use: Yes     Alcohol/week: 8.0 standard drinks of alcohol     Types: 8 Cans of beer per week    Drug use: Never    Sexual activity: Yes     Partners: Male     Birth control/protection: None     Social Drivers of Health     Financial Resource Strain: Low Risk  (9/3/2024)    Overall Financial Resource Strain (CARDIA)     Difficulty of Paying Living Expenses: Not very hard   Food Insecurity: No Food Insecurity (9/3/2024)    Hunger Vital Sign     Worried  About Running Out of Food in the Last Year: Never true     Ran Out of Food in the Last Year: Never true   Transportation Needs: No Transportation Needs (7/24/2024)    TRANSPORTATION NEEDS     Transportation : No   Physical Activity: Inactive (9/3/2024)    Exercise Vital Sign     Days of Exercise per Week: 0 days     Minutes of Exercise per Session: 0 min   Stress: Stress Concern Present (9/3/2024)    [employer] - Occupational Stress Questionnaire     Feeling of Stress : Very much   Housing Stability: Low Risk  (9/3/2024)    Housing Stability Vital Sign     Unable to Pay for Housing in the Last Year: No     Homeless in the Last Year: No       Review of patient's allergies indicates:   Allergen Reactions    Aspirin Shortness Of Breath     Other reaction(s): severe asthma attack    Ibuprofen      Other reaction(s): severe asthma attack    Penicillins      Other reaction(s): severe asthma attack    Codeine Anxiety     Other reaction(s): makes her anxious       Current Outpatient Medications   Medication Instructions    albuterol (PROVENTIL/VENTOLIN HFA) 90 mcg/actuation inhaler USE 2 INHALATIONS BY MOUTH EVERY 6 HOURS AS NEEDED FOR WHEEZING    ALPRAZolam (XANAX) 0.5 mg, Oral, Nightly PRN    atenoloL (TENORMIN) 25 MG tablet See Instructions, Take 1 tablet by mouth once daily, # 90 tab(s), 3 Refill(s), Pharmacy: [facility], 164, cm, Height/Length Dosing, 08/15/21 8:13:00 CDT, 65.7, kg, Weight Dosing, 08/15/21 8:13:00 CDT    butalbital-acetaminophen-caffeine -40 mg (ESGIC) -40 mg per tablet 1 tablet, Oral, 3 times daily PRN    DULoxetine (CYMBALTA) 60 mg, Oral, Daily, 1 cap daily for seven days then 2 caps daily thereafter    gabapentin (NEURONTIN) 300 mg, Oral, 2 times daily    lisinopriL-hydrochlorothiazide (PRINZIDE,ZESTORETIC) 20-12.5 mg per tablet 1 tablet, Oral, Daily    pantoprazole (PROTONIX) 40 mg, Oral, Daily    potassium chloride (MICRO-K) 10 MEQ CpSR 10 mEq,  Oral, Daily    valACYclovir (VALTREX) 1,000 mg, Oral, 2 times daily         Objective:      Physical Exam:  General- Patient alert and oriented x3 in NAD  Speech - nml  HEENT- EOMI  Resp- No increased WOB noted. Not using accessory muscles.  Skin-  No Jaundice. No visible skin lesions.        Assessment/Plan:     Problem List Items Addressed This Visit          Neuro    Neuralgia    Migraine with aura and without status migrainosus, not intractable - Primary    PAP therapy not warranted; consider repeat SS in the future    Ok to increase the Gabapentin to 600 mg TID    ESR was nml  CRP elevated - PCP aware - no further workup from her standpoint.        Follow up 6 months, sooner if needed - tito Purvis, MSN, APRN, AGACNP-BC

## 2024-12-10 ENCOUNTER — TELEPHONE (OUTPATIENT)
Dept: PRIMARY CARE CLINIC | Facility: CLINIC | Age: 49
End: 2024-12-10
Payer: COMMERCIAL

## 2024-12-10 DIAGNOSIS — F41.1 GAD (GENERALIZED ANXIETY DISORDER): Primary | ICD-10-CM

## 2024-12-10 RX ORDER — BUPROPION HYDROCHLORIDE 150 MG/1
150 TABLET ORAL 2 TIMES DAILY
Qty: 60 TABLET | Refills: 3 | Status: SHIPPED | OUTPATIENT
Start: 2024-12-10

## 2024-12-10 RX ORDER — BUPROPION HYDROCHLORIDE 150 MG/1
150 TABLET ORAL 2 TIMES DAILY
COMMUNITY
End: 2024-12-10 | Stop reason: SDUPTHER

## 2024-12-10 NOTE — TELEPHONE ENCOUNTER
----- Message from Diane sent at 12/10/2024  8:25 AM CST -----  Who Called: Marilynn Robles    Caller is requesting assistance/information from provider's office.    Symptoms (please be specific): n/a   How long has patient had these symptoms:  n/a  List of preferred pharmacies on file (remove unneeded): [unfilled]  If different, enter pharmacy into here including location and phone number: [facility] [address] [place] [address]   Phone: [phone]  Fax: [phone]            Preferred Method of Contact: Phone Call  Patient's Preferred Phone Number on File: [phone]   Best Call Back Number, if different:  Additional Information: medical advice,  pt called to request a RX for buPROPion (WELLBUTRIN SR) 150 MG TBSR 12 hr tablet (Discontinued) 60 tablet, pt stated she has taken medication in the past and would like to start again, please advise

## 2024-12-16 ENCOUNTER — TELEPHONE (OUTPATIENT)
Dept: NEUROLOGY | Facility: CLINIC | Age: 49
End: 2024-12-16
Payer: COMMERCIAL

## 2024-12-16 ENCOUNTER — PATIENT MESSAGE (OUTPATIENT)
Dept: NEUROLOGY | Facility: CLINIC | Age: 49
End: 2024-12-16
Payer: COMMERCIAL

## 2024-12-16 DIAGNOSIS — M79.2 NEURALGIA: Primary | ICD-10-CM

## 2024-12-16 DIAGNOSIS — G43.109 MIGRAINE WITH AURA AND WITHOUT STATUS MIGRAINOSUS, NOT INTRACTABLE: ICD-10-CM

## 2024-12-16 RX ORDER — DULOXETIN HYDROCHLORIDE 30 MG/1
60 CAPSULE, DELAYED RELEASE ORAL DAILY
Qty: 180 CAPSULE | Refills: 3 | Status: SHIPPED | OUTPATIENT
Start: 2024-12-16

## 2024-12-23 ENCOUNTER — OFFICE VISIT (OUTPATIENT)
Dept: SURGICAL ONCOLOGY | Facility: CLINIC | Age: 49
End: 2024-12-23
Payer: COMMERCIAL

## 2024-12-23 VITALS
HEART RATE: 72 BPM | BODY MASS INDEX: 27.39 KG/M2 | WEIGHT: 164.38 LBS | DIASTOLIC BLOOD PRESSURE: 71 MMHG | HEIGHT: 65 IN | SYSTOLIC BLOOD PRESSURE: 104 MMHG

## 2024-12-23 DIAGNOSIS — K64.2 GRADE III HEMORRHOIDS: Primary | ICD-10-CM

## 2024-12-23 PROCEDURE — 3008F BODY MASS INDEX DOCD: CPT | Mod: CPTII,S$GLB,,

## 2024-12-23 PROCEDURE — 3074F SYST BP LT 130 MM HG: CPT | Mod: CPTII,S$GLB,,

## 2024-12-23 PROCEDURE — 99999 PR PBB SHADOW E&M-EST. PATIENT-LVL IV: CPT | Mod: PBBFAC,,,

## 2024-12-23 PROCEDURE — 3078F DIAST BP <80 MM HG: CPT | Mod: CPTII,S$GLB,,

## 2024-12-23 PROCEDURE — 99214 OFFICE O/P EST MOD 30 MIN: CPT | Mod: S$GLB,,,

## 2024-12-23 PROCEDURE — 4010F ACE/ARB THERAPY RXD/TAKEN: CPT | Mod: CPTII,S$GLB,,

## 2024-12-23 PROCEDURE — 1159F MED LIST DOCD IN RCRD: CPT | Mod: CPTII,S$GLB,,

## 2024-12-23 PROCEDURE — 1160F RVW MEDS BY RX/DR IN RCRD: CPT | Mod: CPTII,S$GLB,,

## 2024-12-23 RX ORDER — HYDROCODONE BITARTRATE AND ACETAMINOPHEN 10; 325 MG/1; MG/1
1 TABLET ORAL EVERY 6 HOURS PRN
Qty: 30 TABLET | Refills: 0 | Status: SHIPPED | OUTPATIENT
Start: 2024-12-23

## 2024-12-23 NOTE — PROGRESS NOTES
Colorectal Surgery  Patient ID: [id number]     Chief Complaint: Pre-op Exam (Pre Op for Surgery on 1/06/2025)      HPI:     Marilynn Robles is a 49 y.o. female here today for a pre op appointment. She is scheduled for a hemorrhoidectomy on 1/6/25. She denies worsening rectal bleeding or pain. She is ready for surgery.    Current Outpatient Medications   Medication Instructions    albuterol (PROVENTIL/VENTOLIN HFA) 90 mcg/actuation inhaler USE 2 INHALATIONS BY MOUTH EVERY 6 HOURS AS NEEDED FOR WHEEZING    ALPRAZolam (XANAX) 0.5 mg, Oral, Nightly PRN    atenoloL (TENORMIN) 25 MG tablet See Instructions, Take 1 tablet by mouth once daily, # 90 tab(s), 3 Refill(s), Pharmacy: [facility], 164, cm, Height/Length Dosing, 08/15/21 8:13:00 CDT, 65.7, kg, Weight Dosing, 08/15/21 8:13:00 CDT    buPROPion (WELLBUTRIN XL) 150 mg, Oral, 2 times daily    butalbital-acetaminophen-caffeine -40 mg (ESGIC) -40 mg per tablet 1 tablet, Oral, 3 times daily PRN    DULoxetine (CYMBALTA) 60 mg, Oral, Daily, 1 cap daily for seven days then 2 caps daily thereafter    gabapentin (NEURONTIN) 300 mg, Oral, 2 times daily    lisinopriL-hydrochlorothiazide (PRINZIDE,ZESTORETIC) 20-12.5 mg per tablet 1 tablet, Oral, Daily    pantoprazole (PROTONIX) 40 mg, Oral, Daily    potassium chloride (MICRO-K) 10 MEQ CpSR 10 mEq, Oral, Daily    valACYclovir (VALTREX) 1,000 mg, Oral, 2 times daily       Patient is allergic to aspirin, ibuprofen, penicillins, and codeine.     Patient Care Team:  Jada Escobedo MD as PCP - General (Family Medicine)  Fab Bedoya MD as Consulting Physician (Gastroenterology)  Allan Pearce MD (Plastic Surgery)  Eagle Larios DO (Surgery)  Isaiah Brooks MD as Consulting Physician (Cardiothoracic Surgery)  Ivan Menon Jr., MD as Consulting Physician (Obstetrics and Gynecology)  Kirti Rubio MD (Dermatology)     Subjective:     Review of Systems    12 point review of  "systems conducted, negative except as stated in the history of present illness. See HPI for details.    Objective:     Visit Vitals  /71 (BP Location: Right arm, Patient Position: Sitting)   Pulse 72   Ht 5' 5" (1.651 m)   Wt 74.6 kg (164 lb 6.4 oz)   SpO2 (P) 97%   BMI 27.36 kg/m²       Physical Exam    General: Alert and oriented, No acute distress.  Head: Normocephalic, Atraumatic.  Eye: Sclera non-icteric.  Respiratory: Non-labored respirations, Symmetrical chest wall expansion.  Cardiovascular: Regular rate.  Gastrointestinal: Soft, Non-tender, Non-distended, Normal bowel sounds.  Integumentary: Warm, Dry, Intact, No rashes.  Extremities: No edema.  Neurologic: No focal deficits.    Labs Reviewed:     Chemistry:  Lab Results   Component Value Date     07/19/2024    K 3.6 07/19/2024    BUN 10.1 07/19/2024    CREATININE 0.89 07/19/2024    EGFRNORACEVR >60 07/19/2024    GLUCOSE 94 07/19/2024    CALCIUM 9.7 07/19/2024    ALKPHOS 126 07/19/2024    LABPROT 6.4 07/19/2024    ALBUMIN 3.8 07/19/2024    BILIDIR 0.3 12/27/2021    IBILI 0.50 12/27/2021    AST 14 07/19/2024    ALT 16 07/19/2024        Hematology:  Lab Results   Component Value Date    WBC 7.67 07/19/2024    HGB 13.5 07/19/2024    HCT 40.8 07/19/2024     07/19/2024       Assessment:       ICD-10-CM ICD-9-CM   1. Grade III hemorrhoids  K64.2 455.0        Plan:       1. Grade III hemorrhoids    - Hemorrhoidectomy scheduled for 1/6/25  - Procedure explained in detail including risks and benefits. All questions answered at this time.       No follow-ups on file. In addition to their scheduled follow up, the patient has also been instructed to follow up on as needed basis.     "

## 2024-12-23 NOTE — H&P (VIEW-ONLY)
Colorectal Surgery  Patient ID: [id number]     Chief Complaint: Pre-op Exam (Pre Op for Surgery on 1/06/2025)      HPI:     Marilynn Robles is a 49 y.o. female here today for a pre op appointment. She is scheduled for a hemorrhoidectomy on 1/6/25. She denies worsening rectal bleeding or pain. She is ready for surgery.    Current Outpatient Medications   Medication Instructions    albuterol (PROVENTIL/VENTOLIN HFA) 90 mcg/actuation inhaler USE 2 INHALATIONS BY MOUTH EVERY 6 HOURS AS NEEDED FOR WHEEZING    ALPRAZolam (XANAX) 0.5 mg, Oral, Nightly PRN    atenoloL (TENORMIN) 25 MG tablet See Instructions, Take 1 tablet by mouth once daily, # 90 tab(s), 3 Refill(s), Pharmacy: [facility], 164, cm, Height/Length Dosing, 08/15/21 8:13:00 CDT, 65.7, kg, Weight Dosing, 08/15/21 8:13:00 CDT    buPROPion (WELLBUTRIN XL) 150 mg, Oral, 2 times daily    butalbital-acetaminophen-caffeine -40 mg (ESGIC) -40 mg per tablet 1 tablet, Oral, 3 times daily PRN    DULoxetine (CYMBALTA) 60 mg, Oral, Daily, 1 cap daily for seven days then 2 caps daily thereafter    gabapentin (NEURONTIN) 300 mg, Oral, 2 times daily    lisinopriL-hydrochlorothiazide (PRINZIDE,ZESTORETIC) 20-12.5 mg per tablet 1 tablet, Oral, Daily    pantoprazole (PROTONIX) 40 mg, Oral, Daily    potassium chloride (MICRO-K) 10 MEQ CpSR 10 mEq, Oral, Daily    valACYclovir (VALTREX) 1,000 mg, Oral, 2 times daily       Patient is allergic to aspirin, ibuprofen, penicillins, and codeine.     Patient Care Team:  Jada Escobedo MD as PCP - General (Family Medicine)  Fab Bedoya MD as Consulting Physician (Gastroenterology)  Allan Pearce MD (Plastic Surgery)  Eagle Larios DO (Surgery)  Isaiah Brooks MD as Consulting Physician (Cardiothoracic Surgery)  Ivan Menon Jr., MD as Consulting Physician (Obstetrics and Gynecology)  Kirti Rubio MD (Dermatology)     Subjective:     Review of Systems    12 point review of  "systems conducted, negative except as stated in the history of present illness. See HPI for details.    Objective:     Visit Vitals  /71 (BP Location: Right arm, Patient Position: Sitting)   Pulse 72   Ht 5' 5" (1.651 m)   Wt 74.6 kg (164 lb 6.4 oz)   SpO2 (P) 97%   BMI 27.36 kg/m²       Physical Exam    General: Alert and oriented, No acute distress.  Head: Normocephalic, Atraumatic.  Eye: Sclera non-icteric.  Respiratory: Non-labored respirations, Symmetrical chest wall expansion.  Cardiovascular: Regular rate.  Gastrointestinal: Soft, Non-tender, Non-distended, Normal bowel sounds.  Integumentary: Warm, Dry, Intact, No rashes.  Extremities: No edema.  Neurologic: No focal deficits.    Labs Reviewed:     Chemistry:  Lab Results   Component Value Date     07/19/2024    K 3.6 07/19/2024    BUN 10.1 07/19/2024    CREATININE 0.89 07/19/2024    EGFRNORACEVR >60 07/19/2024    GLUCOSE 94 07/19/2024    CALCIUM 9.7 07/19/2024    ALKPHOS 126 07/19/2024    LABPROT 6.4 07/19/2024    ALBUMIN 3.8 07/19/2024    BILIDIR 0.3 12/27/2021    IBILI 0.50 12/27/2021    AST 14 07/19/2024    ALT 16 07/19/2024        Hematology:  Lab Results   Component Value Date    WBC 7.67 07/19/2024    HGB 13.5 07/19/2024    HCT 40.8 07/19/2024     07/19/2024       Assessment:       ICD-10-CM ICD-9-CM   1. Grade III hemorrhoids  K64.2 455.0        Plan:       1. Grade III hemorrhoids    - Hemorrhoidectomy scheduled for 1/6/25  - Procedure explained in detail including risks and benefits. All questions answered at this time.       No follow-ups on file. In addition to their scheduled follow up, the patient has also been instructed to follow up on as needed basis.     "

## 2024-12-26 DIAGNOSIS — K64.9 HEMORRHOID: ICD-10-CM

## 2024-12-26 DIAGNOSIS — K64.2 GRADE III HEMORRHOIDS: Primary | ICD-10-CM

## 2024-12-26 RX ORDER — LIDOCAINE HYDROCHLORIDE 10 MG/ML
1 INJECTION, SOLUTION EPIDURAL; INFILTRATION; INTRACAUDAL; PERINEURAL ONCE
OUTPATIENT
Start: 2024-12-26 | End: 2024-12-26

## 2024-12-26 RX ORDER — SODIUM CHLORIDE 9 MG/ML
INJECTION, SOLUTION INTRAVENOUS CONTINUOUS
OUTPATIENT
Start: 2024-12-26

## 2024-12-30 RX ORDER — DULOXETIN HYDROCHLORIDE 60 MG/1
60 CAPSULE, DELAYED RELEASE ORAL DAILY
COMMUNITY

## 2025-01-02 ENCOUNTER — HOSPITAL ENCOUNTER (OUTPATIENT)
Dept: RADIOLOGY | Facility: HOSPITAL | Age: 50
Discharge: HOME | End: 2025-01-02
Attending: COLON & RECTAL SURGERY
Payer: COMMERCIAL

## 2025-01-02 PROCEDURE — 71046 X-RAY EXAM CHEST 2 VIEWS: CPT | Mod: TC

## 2025-01-05 ENCOUNTER — ANESTHESIA EVENT (OUTPATIENT)
Facility: HOSPITAL | Age: 50
End: 2025-01-05
Payer: COMMERCIAL

## 2025-01-05 RX ORDER — SCOLOPAMINE TRANSDERMAL SYSTEM 1 MG/1
1 PATCH, EXTENDED RELEASE TRANSDERMAL ONCE
Status: CANCELLED | OUTPATIENT
Start: 2025-01-05

## 2025-01-06 ENCOUNTER — ANESTHESIA (OUTPATIENT)
Facility: HOSPITAL | Age: 50
End: 2025-01-06
Payer: COMMERCIAL

## 2025-01-06 ENCOUNTER — HOSPITAL ENCOUNTER (OUTPATIENT)
Facility: HOSPITAL | Age: 50
Discharge: HOME | End: 2025-01-06
Attending: COLON & RECTAL SURGERY | Admitting: COLON & RECTAL SURGERY
Payer: COMMERCIAL

## 2025-01-06 VITALS
OXYGEN SATURATION: 93 % | DIASTOLIC BLOOD PRESSURE: 65 MMHG | WEIGHT: 160 LBS | SYSTOLIC BLOOD PRESSURE: 98 MMHG | HEART RATE: 81 BPM | TEMPERATURE: 98 F | RESPIRATION RATE: 18 BRPM | BODY MASS INDEX: 26.66 KG/M2 | HEIGHT: 65 IN

## 2025-01-06 DIAGNOSIS — K64.9 HEMORRHOID: ICD-10-CM

## 2025-01-06 DIAGNOSIS — K64.2 GRADE III HEMORRHOIDS: Primary | ICD-10-CM

## 2025-01-06 PROCEDURE — 63600175 PHARM REV CODE 636 W HCPCS

## 2025-01-06 PROCEDURE — 37000008 HC ANESTHESIA 1ST 15 MINUTES: Performed by: COLON & RECTAL SURGERY

## 2025-01-06 PROCEDURE — 71000039 HC RECOVERY, EACH ADD'L HOUR: Performed by: COLON & RECTAL SURGERY

## 2025-01-06 PROCEDURE — 46260 REMOVE IN/EX HEM GROUPS 2+: CPT | Mod: ,,, | Performed by: COLON & RECTAL SURGERY

## 2025-01-06 PROCEDURE — 25000003 PHARM REV CODE 250: Performed by: COLON & RECTAL SURGERY

## 2025-01-06 PROCEDURE — D9220A PRA ANESTHESIA: Mod: ANES,,, | Performed by: ANESTHESIOLOGY

## 2025-01-06 PROCEDURE — 36000706: Performed by: COLON & RECTAL SURGERY

## 2025-01-06 PROCEDURE — 63600175 PHARM REV CODE 636 W HCPCS: Performed by: NURSE ANESTHETIST, CERTIFIED REGISTERED

## 2025-01-06 PROCEDURE — 36000707: Performed by: COLON & RECTAL SURGERY

## 2025-01-06 PROCEDURE — 63600175 PHARM REV CODE 636 W HCPCS: Performed by: ANESTHESIOLOGY

## 2025-01-06 PROCEDURE — 71000033 HC RECOVERY, INTIAL HOUR: Performed by: COLON & RECTAL SURGERY

## 2025-01-06 PROCEDURE — 37000009 HC ANESTHESIA EA ADD 15 MINS: Performed by: COLON & RECTAL SURGERY

## 2025-01-06 PROCEDURE — 71000015 HC POSTOP RECOV 1ST HR: Performed by: COLON & RECTAL SURGERY

## 2025-01-06 PROCEDURE — 25000003 PHARM REV CODE 250: Performed by: NURSE ANESTHETIST, CERTIFIED REGISTERED

## 2025-01-06 PROCEDURE — 71000016 HC POSTOP RECOV ADDL HR: Performed by: COLON & RECTAL SURGERY

## 2025-01-06 PROCEDURE — D9220A PRA ANESTHESIA: Mod: CRNA,,, | Performed by: NURSE ANESTHETIST, CERTIFIED REGISTERED

## 2025-01-06 RX ORDER — HYDROCORTISONE ACETATE 25 MG/1
SUPPOSITORY RECTAL
Status: DISCONTINUED
Start: 2025-01-06 | End: 2025-01-06 | Stop reason: HOSPADM

## 2025-01-06 RX ORDER — LIDOCAINE HYDROCHLORIDE 10 MG/ML
1 INJECTION, SOLUTION EPIDURAL; INFILTRATION; INTRACAUDAL; PERINEURAL ONCE
Status: DISCONTINUED | OUTPATIENT
Start: 2025-01-06 | End: 2025-01-06 | Stop reason: HOSPADM

## 2025-01-06 RX ORDER — HYDROCODONE BITARTRATE AND ACETAMINOPHEN 10; 325 MG/1; MG/1
TABLET ORAL
Status: DISCONTINUED
Start: 2025-01-06 | End: 2025-01-06 | Stop reason: HOSPADM

## 2025-01-06 RX ORDER — HYDROCODONE BITARTRATE AND ACETAMINOPHEN 10; 325 MG/1; MG/1
1 TABLET ORAL EVERY 6 HOURS PRN
Status: DISCONTINUED | OUTPATIENT
Start: 2025-01-06 | End: 2025-01-06 | Stop reason: HOSPADM

## 2025-01-06 RX ORDER — ROCURONIUM BROMIDE 10 MG/ML
INJECTION, SOLUTION INTRAVENOUS
Status: DISCONTINUED | OUTPATIENT
Start: 2025-01-06 | End: 2025-01-06

## 2025-01-06 RX ORDER — BUPIVACAINE HYDROCHLORIDE AND EPINEPHRINE 2.5; 5 MG/ML; UG/ML
INJECTION, SOLUTION EPIDURAL; INFILTRATION; INTRACAUDAL; PERINEURAL
Status: DISCONTINUED
Start: 2025-01-06 | End: 2025-01-06 | Stop reason: HOSPADM

## 2025-01-06 RX ORDER — FENTANYL CITRATE 50 UG/ML
INJECTION, SOLUTION INTRAMUSCULAR; INTRAVENOUS
Status: DISCONTINUED | OUTPATIENT
Start: 2025-01-06 | End: 2025-01-06

## 2025-01-06 RX ORDER — LIDOCAINE 40 MG/G
CREAM TOPICAL
Status: DISCONTINUED | OUTPATIENT
Start: 2025-01-06 | End: 2025-01-06 | Stop reason: HOSPADM

## 2025-01-06 RX ORDER — HYDROMORPHONE HYDROCHLORIDE 2 MG/ML
0.2 INJECTION, SOLUTION INTRAMUSCULAR; INTRAVENOUS; SUBCUTANEOUS EVERY 5 MIN PRN
Status: DISCONTINUED | OUTPATIENT
Start: 2025-01-06 | End: 2025-01-06 | Stop reason: HOSPADM

## 2025-01-06 RX ORDER — LIDOCAINE HYDROCHLORIDE 10 MG/ML
INJECTION, SOLUTION EPIDURAL; INFILTRATION; INTRACAUDAL; PERINEURAL
Status: DISCONTINUED | OUTPATIENT
Start: 2025-01-06 | End: 2025-01-06

## 2025-01-06 RX ORDER — ONDANSETRON HYDROCHLORIDE 2 MG/ML
4 INJECTION, SOLUTION INTRAVENOUS DAILY PRN
Status: DISCONTINUED | OUTPATIENT
Start: 2025-01-06 | End: 2025-01-06 | Stop reason: HOSPADM

## 2025-01-06 RX ORDER — METHOCARBAMOL 100 MG/ML
1000 INJECTION, SOLUTION INTRAMUSCULAR; INTRAVENOUS ONCE
Status: COMPLETED | OUTPATIENT
Start: 2025-01-06 | End: 2025-01-06

## 2025-01-06 RX ORDER — MEPERIDINE HYDROCHLORIDE 25 MG/ML
12.5 INJECTION INTRAMUSCULAR; INTRAVENOUS; SUBCUTANEOUS EVERY 10 MIN PRN
Status: DISCONTINUED | OUTPATIENT
Start: 2025-01-06 | End: 2025-01-06 | Stop reason: HOSPADM

## 2025-01-06 RX ORDER — HYDROCORTISONE ACETATE 25 MG/1
SUPPOSITORY RECTAL
Status: DISCONTINUED | OUTPATIENT
Start: 2025-01-06 | End: 2025-01-06 | Stop reason: HOSPADM

## 2025-01-06 RX ORDER — METHOCARBAMOL 100 MG/ML
INJECTION, SOLUTION INTRAMUSCULAR; INTRAVENOUS
Status: COMPLETED
Start: 2025-01-06 | End: 2025-01-06

## 2025-01-06 RX ORDER — ONDANSETRON HYDROCHLORIDE 2 MG/ML
INJECTION, SOLUTION INTRAMUSCULAR; INTRAVENOUS
Status: DISCONTINUED | OUTPATIENT
Start: 2025-01-06 | End: 2025-01-06

## 2025-01-06 RX ORDER — MIDAZOLAM HYDROCHLORIDE 1 MG/ML
INJECTION INTRAMUSCULAR; INTRAVENOUS
Status: DISCONTINUED | OUTPATIENT
Start: 2025-01-06 | End: 2025-01-06

## 2025-01-06 RX ORDER — LIDOCAINE 40 MG/G
CREAM TOPICAL
Status: DISCONTINUED
Start: 2025-01-06 | End: 2025-01-06 | Stop reason: HOSPADM

## 2025-01-06 RX ORDER — SODIUM CHLORIDE 9 MG/ML
INJECTION, SOLUTION INTRAVENOUS CONTINUOUS
Status: DISCONTINUED | OUTPATIENT
Start: 2025-01-06 | End: 2025-01-06 | Stop reason: HOSPADM

## 2025-01-06 RX ORDER — OXYCODONE AND ACETAMINOPHEN 5; 325 MG/1; MG/1
1 TABLET ORAL
Status: DISCONTINUED | OUTPATIENT
Start: 2025-01-06 | End: 2025-01-06 | Stop reason: HOSPADM

## 2025-01-06 RX ORDER — BUPIVACAINE HYDROCHLORIDE AND EPINEPHRINE 2.5; 5 MG/ML; UG/ML
INJECTION, SOLUTION EPIDURAL; INFILTRATION; INTRACAUDAL; PERINEURAL
Status: DISCONTINUED | OUTPATIENT
Start: 2025-01-06 | End: 2025-01-06 | Stop reason: HOSPADM

## 2025-01-06 RX ORDER — DEXAMETHASONE SODIUM PHOSPHATE 4 MG/ML
INJECTION, SOLUTION INTRA-ARTICULAR; INTRALESIONAL; INTRAMUSCULAR; INTRAVENOUS; SOFT TISSUE
Status: DISCONTINUED | OUTPATIENT
Start: 2025-01-06 | End: 2025-01-06

## 2025-01-06 RX ORDER — HYDROMORPHONE HYDROCHLORIDE 2 MG/ML
INJECTION, SOLUTION INTRAMUSCULAR; INTRAVENOUS; SUBCUTANEOUS
Status: DISCONTINUED
Start: 2025-01-06 | End: 2025-01-06 | Stop reason: HOSPADM

## 2025-01-06 RX ORDER — ACETAMINOPHEN 10 MG/ML
INJECTION, SOLUTION INTRAVENOUS
Status: DISCONTINUED | OUTPATIENT
Start: 2025-01-06 | End: 2025-01-06

## 2025-01-06 RX ORDER — PROPOFOL 10 MG/ML
VIAL (ML) INTRAVENOUS
Status: DISCONTINUED | OUTPATIENT
Start: 2025-01-06 | End: 2025-01-06

## 2025-01-06 RX ADMIN — HYDROMORPHONE HYDROCHLORIDE 0.2 MG: 2 INJECTION INTRAMUSCULAR; INTRAVENOUS; SUBCUTANEOUS at 10:01

## 2025-01-06 RX ADMIN — METHOCARBAMOL 1000 MG: 100 INJECTION, SOLUTION INTRAMUSCULAR; INTRAVENOUS at 11:01

## 2025-01-06 RX ADMIN — ONDANSETRON HYDROCHLORIDE 4 MG: 2 SOLUTION INTRAMUSCULAR; INTRAVENOUS at 09:01

## 2025-01-06 RX ADMIN — ACETAMINOPHEN 1000 MG: 10 INJECTION, SOLUTION INTRAVENOUS at 09:01

## 2025-01-06 RX ADMIN — HYDROCODONE BITARTRATE AND ACETAMINOPHEN 1 TABLET: 10; 325 TABLET ORAL at 01:01

## 2025-01-06 RX ADMIN — DEXMEDETOMIDINE HYDROCHLORIDE 2 MCG: 400 INJECTION INTRAVENOUS at 09:01

## 2025-01-06 RX ADMIN — HYDROMORPHONE HYDROCHLORIDE 0.2 MG: 2 INJECTION INTRAMUSCULAR; INTRAVENOUS; SUBCUTANEOUS at 11:01

## 2025-01-06 RX ADMIN — SODIUM CHLORIDE, POTASSIUM CHLORIDE, SODIUM LACTATE AND CALCIUM CHLORIDE: 600; 310; 30; 20 INJECTION, SOLUTION INTRAVENOUS at 09:01

## 2025-01-06 RX ADMIN — MIDAZOLAM HYDROCHLORIDE 2 MG: 1 INJECTION, SOLUTION INTRAMUSCULAR; INTRAVENOUS at 09:01

## 2025-01-06 RX ADMIN — SUGAMMADEX 140 MG: 100 INJECTION, SOLUTION INTRAVENOUS at 10:01

## 2025-01-06 RX ADMIN — DEXAMETHASONE SODIUM PHOSPHATE 4 MG: 4 INJECTION, SOLUTION INTRA-ARTICULAR; INTRALESIONAL; INTRAMUSCULAR; INTRAVENOUS; SOFT TISSUE at 09:01

## 2025-01-06 RX ADMIN — ROCURONIUM BROMIDE 40 MG: 10 INJECTION, SOLUTION INTRAVENOUS at 09:01

## 2025-01-06 RX ADMIN — PROPOFOL 150 MG: 10 INJECTION, EMULSION INTRAVENOUS at 09:01

## 2025-01-06 RX ADMIN — LIDOCAINE HYDROCHLORIDE 20 MG: 10 INJECTION, SOLUTION EPIDURAL; INFILTRATION; INTRACAUDAL; PERINEURAL at 09:01

## 2025-01-06 RX ADMIN — FENTANYL CITRATE 50 MCG: 50 INJECTION, SOLUTION INTRAMUSCULAR; INTRAVENOUS at 09:01

## 2025-01-06 RX ADMIN — FENTANYL CITRATE 25 MCG: 50 INJECTION, SOLUTION INTRAMUSCULAR; INTRAVENOUS at 10:01

## 2025-01-06 RX ADMIN — METHOCARBAMOL 1000 MG: 100 INJECTION INTRAMUSCULAR; INTRAVENOUS at 11:01

## 2025-01-06 RX ADMIN — DEXMEDETOMIDINE HYDROCHLORIDE 2 MCG: 400 INJECTION INTRAVENOUS at 10:01

## 2025-01-06 RX ADMIN — FENTANYL CITRATE 25 MCG: 50 INJECTION, SOLUTION INTRAMUSCULAR; INTRAVENOUS at 09:01

## 2025-01-06 NOTE — DISCHARGE SUMMARY
[facility] Surgical - Periop Services 2nd Floor  Discharge Note  Short Stay    Procedure(s) (LRB):  HEMORRHOIDECTOMY INVOLVING TWO OR MORE ANAL COLUMNS (N/A)      OUTCOME: Patient tolerated treatment/procedure well without complication and is now ready for discharge.    DISPOSITION: Home or Self Care    FINAL DIAGNOSIS:  Grade III hemorrhoids    FOLLOWUP: In clinic    DISCHARGE INSTRUCTIONS:    Discharge Procedure Orders   Diet Adult Regular     Ice to affected area   Order Comments: For 24 hours     Lifting restrictions   Order Comments: Avoid heavy lifting and excessive straining        TIME SPENT ON DISCHARGE: 15 minutes

## 2025-01-06 NOTE — INTERVAL H&P NOTE
The patient has been examined and the H&P has been reviewed:    I concur with the findings and no changes have occurred since H&P was written.    Surgery risks, benefits and alternative options discussed and understood by patient/family.          Active Hospital Problems    Diagnosis  POA    *Grade III hemorrhoids [K64.2]  Yes      Resolved Hospital Problems   No resolved problems to display.

## 2025-01-06 NOTE — DISCHARGE INSTRUCTIONS
May remove dressing and apply ice for 24 hours, then recommend warm Sitz baths as needed for pain and after BM.     Take Miralax one capful daily, starting today.    Avoid foods high in fiber and fiber supplement.    Recommend Recticare or Lidocaine cream externally as needed for pain.    Mild bleeding or discharge is expected.    Notify MD for temperature >101, excessive bleeding, and difficulty urinating.        Surgical Procedures for Hemorrhoids  Care After    Refer to this sheet in the next few weeks. These instructions provide you with information about caring for yourself after your procedure. Your health care provider may also give you more specific instructions. Your treatment has been planned according to current medical practices, but problems sometimes occur. Call your health care provider if you have any problems or questions after your procedure.    What can I expect after the procedure?    After the procedure, it is common to have:    Rectal pain.    Pain when you are having a bowel movement.    Slight rectal bleeding.    Follow these instructions at home:    Medicines    Take over-the-counter and prescription medicines only as told by your health care provider.    Do not drive or operate heavy machinery while taking prescription pain medicine.    Use a stool softener or a bulk laxative as told by your health care provider.    Activity    Rest at home. Return to your normal activities as told by your health care provider.    Do not lift anything that is heavier than 10 lb (4.5 kg).    Do not sit for long periods of time. Take a walk every day or as told by your health care provider.    Do not strain to have a bowel movement. Do not spend a long time sitting on the toilet.    Eating and drinking    Drink enough fluid to keep your urine clear or pale yellow.    General instructions    Sit in a warm bath 2-3 times per day to relieve soreness or itching.    Keep all follow-up visits as told by your health  care provider. This is important.    Contact a health care provider if:    Your pain medicine is not helping.    You have a fever or chills.    You become constipated.    You have trouble passing urine.    Get help right away if:    You have very bad rectal pain.    You have heavy bleeding from your rectum.    This information is not intended to replace advice given to you by your health care provider. Make sure you discuss any questions you have with your health care provider.

## 2025-01-06 NOTE — ANESTHESIA POSTPROCEDURE EVALUATION
Anesthesia Post Evaluation    Patient: Marilynn Robles    Procedure(s) Performed: Procedure(s) (LRB):  HEMORRHOIDECTOMY INVOLVING TWO OR MORE ANAL COLUMNS (N/A)    Final Anesthesia Type: general      Patient location during evaluation: PACU  Patient participation: Yes- Able to Participate  Level of consciousness: awake and alert  Post-procedure vital signs: reviewed and stable  Pain management: adequate  Airway patency: patent    PONV status at discharge: No PONV  Anesthetic complications: no      Cardiovascular status: blood pressure returned to baseline  Respiratory status: unassisted  Hydration status: euvolemic  Follow-up not needed.              Vitals Value Taken Time   BP 95/63 01/06/25 1141   Temp  01/06/25 1211   Pulse 69 01/06/25 1143   Resp 18 01/06/25 1113   SpO2 99 % 01/06/25 1143   Vitals shown include unfiled device data.      No case tracking events are documented in the log.      Pain/Viktoriya Score: Pain Rating Prior to Med Admin: 10 (1/6/2025 11:13 AM)

## 2025-01-06 NOTE — ANESTHESIA PREPROCEDURE EVALUATION
01/05/2025  Marilynn Robles is a 49 y.o., female.  Procedure Information    Case: [id number] Date/Time: 01/06/25 0834   Procedure: HEMORRHOIDECTOMY INVOLVING TWO OR MORE ANAL COLUMNS   Anesthesia type: General   Diagnosis: Grade III hemorrhoids [K64.2]   Pre-op diagnosis: Grade III hemorrhoids [K64.2]   Location: [address] OR  / [address] OR   Surgeons: Bart Ram MD       Pre-op Assessment    I have reviewed the Patient Summary Reports.     I have reviewed the Nursing Notes. I have reviewed the NPO Status.   I have reviewed the Medications.     Review of Systems  Anesthesia Hx:  No problems with previous Anesthesia                Hematology/Oncology:  Hematology Normal   Oncology Normal                                   EENT/Dental:  EENT/Dental Normal           Cardiovascular:  Exercise tolerance: good   Hypertension                  Functional Capacity good / => 4 METS                         Pulmonary:        Sleep Apnea                Renal/:   Denies Chronic Renal Disease.                Hepatic/GI:     GERD                Musculoskeletal:  Musculoskeletal Normal                Neurological:    Neuromuscular Disease,  Headaches                                 Endocrine:  Endocrine Normal          Denies Morbid Obesity / BMI > 40  Dermatological:  Skin Normal    Psych:   anxiety                 Physical Exam  General: Alert, Oriented, Well nourished and Cooperative    Airway:  Mallampati: II   Mouth Opening: Normal  TM Distance: Normal  Tongue: Normal  Neck ROM: Normal ROM    Dental:  Intact    Chest/Lungs:  Clear to auscultation, Normal Respiratory Rate    Heart:  Rate: Normal  Rhythm: Regular Rhythm       Latest Reference Range & Units Most Recent   WBC 4.50 - 11.50 x10(3)/mcL 7.67  7/19/24 08:04   RBC 4.20 - 5.40 x10(6)/mcL 4.00 (L)  7/19/24 08:04   Hemoglobin 12.0 - 16.0 g/dL  13.5  7/19/24 08:04   Hematocrit 37.0 - 47.0 % 40.8  7/19/24 08:04   MCV 80.0 - 94.0 fL 102.0 (H)  7/19/24 08:04   MCH 27.0 - 31.0 pg 33.8 (H)  7/19/24 08:04   MCHC 33.0 - 36.0 g/dL 33.1  7/19/24 08:04   RDW 11.5 - 17.0 % 13.1  7/19/24 08:04   Platelet Count 130 - 400 x10(3)/mcL 227  7/19/24 08:04   MPV 7.4 - 10.4 fL 10.6 (H)  7/19/24 08:04   Neut % % 49.0  7/19/24 08:04   LYMPH % % 40.5  7/19/24 08:04   Mono % % 8.7  7/19/24 08:04   Eos % % 1.2  7/19/24 08:04   Basophil % % 0.3  7/19/24 08:04   Immature Granulocytes % 0.3  7/19/24 08:04   Gran # (ANC) 2.10 - 9.20 x10(3)/mcL 6.96  12/27/21 08:00   Neut # 2.1 - 9.2 x10(3)/mcL 3.76  7/19/24 08:04   Lymph # 0.6 - 4.6 x10(3)/mcL 3.11  7/19/24 08:04   Mono # 0.1 - 1.3 x10(3)/mcL 0.67  7/19/24 08:04   Eos # 0 - 0.9 x10(3)/mcL 0.09  7/19/24 08:04   Baso # <=0.2 x10(3)/mcL 0.02  7/19/24 08:04   Immature Grans (Abs) 0 - 0.04 x10(3)/mcL 0.02  7/19/24 08:04   nRBC % 0.0  7/19/24 08:04   Sed Rate 0 - 20 mm/hr 16  11/5/24 08:38   Thrombin Time 0 - 22 seconds 15  6/28/23 12:23   Sodium 136 - 145 mmol/L 140  7/19/24 08:04   Potassium 3.5 - 5.1 mmol/L 3.6  7/19/24 08:04   Chloride 98 - 107 mmol/L 104  7/19/24 08:04   CO2 22 - 29 mmol/L 29  7/19/24 08:04   Anion Gap mEq/L 7.0  7/19/24 08:04   BUN 9.8 - 20.1 mg/dL 10.1  7/19/24 08:04   Creatinine 0.55 - 1.02 mg/dL 0.89  7/19/24 08:04   BUN/CREAT RATIO  11  7/19/24 08:04   eGFR mL/min/1.73/m2 >60  7/19/24 08:04   eGFR if non African American mL/min/1.73 m2 >60  12/27/21 08:00   eGFR if African American  >60  12/27/21 08:00   Glucose 74 - 100 mg/dL 94  7/19/24 08:04   Calcium 8.4 - 10.2 mg/dL 9.7  7/19/24 08:04   ALP 40 - 150 unit/L 126  7/19/24 08:04   PROTEIN TOTAL 6.4 - 8.3 gm/dL 6.4  7/19/24 08:04   Albumin 3.5 - 5.0 g/dL 3.8  7/19/24 08:04   Albumin/Globulin Ratio 1.1 - 2.0 ratio 1.5  7/19/24 08:04   BILIRUBIN TOTAL <=1.5 mg/dL 0.4  7/19/24 08:04   Bilirubin Direct 0.0 - 0.5 mg/dL 0.3  12/27/21 08:00   Bilirubin, Indirect 0.00 -  0.80 mg/dL 0.50  12/27/21 08:00   AST 5 - 34 unit/L 14  7/19/24 08:04   ALT 0 - 55 unit/L 16  7/19/24 08:04   CRP <5.00 mg/L 17.90 (H)  11/5/24 08:38   Globulin, Total 2.4 - 3.5 gm/dL 2.6  7/19/24 08:04   Cholesterol Total <=200 mg/dL 205 (H)  7/19/24 08:04   HDL 35 - 60 mg/dL 36  7/19/24 08:04   Total Cholesterol/HDL Ratio 0 - 5  6 (H)  7/19/24 08:04   Triglycerides 37 - 140 mg/dL 137  7/19/24 08:04   LDL Cholesterol 50.00 - 140.00 mg/dL 142.00 (H)  7/19/24 08:04   Very Low Density Lipoprotein  27  7/19/24 08:04   Homocysteine 5.1 - 15.4 umol/L 12.0  6/28/23 12:23   Vitamin D 30.0 - 80.0 ng/mL 40.9  12/27/21 08:00   TSH 0.3500 - 4.9400 uIU/mL 1.0367  8/29/22 08:13   Estradiol pg/mL <24  1/13/23 08:09   Progesterone ng/mL <0.5  8/29/22 08:13   MELA Negative  Negative  6/28/23 12:23   ds DNA Ab <10.0 IU/mL 2.1  6/28/23 12:23   RAPID STREP A SCREEN  Negative  8/15/21 08:14   SARS-CoV-2 RNA, Amplification, Qual Negative  Negative  8/8/22 08:53   SARS-CoV2 (COVID-19) Qualitative PCR >Negative  Positive !  12/29/21 09:25   Color, UA Yellow, Light-Yellow, Straw, Dark-Yellow  Light-Yellow  11/21/23 09:30   Appearance, UA Clear  Clear  11/21/23 09:30   Specific Gravity,UA 1.005 - 1.030  1.019  11/21/23 09:30   pH, UA 5.0 - 8.5  5.5  11/21/23 09:30   Protein, UA Negative  Negative  11/21/23 09:30   Glucose, UA Negative, Normal  Normal  11/21/23 09:30   Ketones, UA Negative  Negative  11/21/23 09:30   Blood, UA Negative  Trace !  11/21/23 09:30   NITRITE UA Negative  Negative  11/21/23 09:30   Bilirubin (UA) Negative  Negative  11/21/23 09:30   Urobilinogen, UA 0.2, 1.0, Normal  Normal  11/21/23 09:30   Leukocyte Esterase, UA Negative  Negative  11/21/23 09:30   RBC, UA None Seen, 0-2, 3-5, 0-5 /HPF 0-5  11/21/23 09:30   WBC, UA None Seen, 0-2, 3-5, 0-5 /HPF 0-5  11/21/23 09:30   Bacteria, UA None Seen, Trace /HPF Trace  11/21/23 09:30   Squam Epithel, UA <=5 /HPF <5  1/13/23 08:09   Squamous Epithelial Cells, UA None Seen  /HPF Trace  11/21/23 09:30   Mucous, UA None Seen /LPF Trace !  11/21/23 09:30   CULTURE, URINE  Rpt !  11/17/23 09:16   WOUND CULTURE (OLG)  Rpt  5/16/23 12:00   Influenza B Ag  Negative  12/29/21 08:36   Inflenza A Ag  Negative  12/29/21 08:36    Acceptable  Yes  8/8/22 08:53   CT HEAD WITHOUT CONTRAST  Rpt  4/1/21 12:20   XR CHEST PA AND LATERAL  Rpt  1/2/25 08:40   MRI BRAIN WITHOUT CONTRAST  Rpt  7/31/24 16:35   US THYROID  Rpt (C) (E)  6/8/21 14:30   US TRANSCRAN DOPPLER INTRACRAN ARTR LTD  Rpt  7/30/24 11:03   EKG 12-LEAD  Rpt  1/2/25 08:31   Antithrombin Activity, P 80 - 130 % 110  6/28/23 12:23   BCS Recommendation External  Repeat mammogram in 1 year (E)  12/1/22 00:00   DRVV Conf Ratio  1.03  6/28/23 12:23   DRVV Norm Ratio 0.00 - 1.19  1.03  6/28/23 12:23   DRVV Scr Ratio  1.06  6/28/23 12:23   F5DNA Interpretation  SEE COMMENTS  6/28/23 12:23   F5DNA Reviewed By  SEE COMMENTS  6/28/23 12:23   Factor V Leiden (R506Q) Mutation Negative  Negative  6/28/23 12:23   Lupus Anticoagulant Interp  Interpretative Report for Lupus Anticoagulant    Interpretation:  Lupus anticoagulant not detected.  Bon Hawkins MD    6/28/23 12:23   Lupus Anticoag ST Calc 0.0 - 7.9 seconds 1.6  6/28/23 12:23   DHEA SULFATE 27 - 240 mcg/dL 365 (H)  1/13/23 08:09   QRS Duration ms 70  1/2/25 08:31   [facility]HS QTC Calculation ms 434  1/2/25 08:31   Pancreatic Elastase-1 >200 (Normal) mcg/g >500  3/13/23 08:45   POCT INFLUENZA A/B MOLECULAR  Rpt  12/29/21 08:36   POCT RAPID STREP A  Rpt  8/15/21 08:14   Protein C Ag, P 72 - 160 % 111  6/28/23 12:23   Protein S Ag, Free, P 50 - 160 % 136  6/28/23 12:23   SARS-COV-2 RDRP GENE  Rpt  8/8/22 08:53   (L): Data is abnormally low  (H): Data is abnormally high  !: Data is abnormal  (C): Corrected  Rpt: View report in Results Review for more information  (E): External lab resultNarrative  Performed by: GEMUSE  Test Reason : K64.2,    Vent. Rate :  73 BPM     Atrial Rate :  73 BPM      P-R Int : 166 ms          QRS Dur :  70 ms      QT Int : 394 ms       P-R-T Axes :  77  94  75 degrees    QTcB Int : 434 ms    Normal sinus rhythm  Rightward axis  Borderline Abnormal ECG  No previous ECGs available  Confirmed by Guero Crawley ([id number]) on 1/2/2025 10:42:19 AM    Referred By: FADIA HARDIN           Confirmed By: Guero Crawley   Specimen Collected: 01/02/25 08:31 CST Last Resulted: 01/02/25 10:42 CST                Anesthesia Plan  Type of Anesthesia, risks & benefits discussed:    Anesthesia Type: Gen ETT  Intra-op Monitoring Plan: Standard ASA Monitors  Post Op Pain Control Plan: multimodal analgesia  Induction:  IV and Inhalation  Airway Plan: Direct  Informed Consent: Informed consent signed with the Patient and all parties understand the risks and agree with anesthesia plan.  All questions answered. Patient consented to blood products? Yes  ASA Score: 2  Day of Surgery Review of History & Physical: H&P Update referred to the surgeon/provider.    Ready For Surgery From Anesthesia Perspective.     .

## 2025-01-06 NOTE — ANESTHESIA PROCEDURE NOTES
Intubation    Date/Time: 1/6/2025 9:28 AM    Performed by: Sania Bain CRNA  Authorized by: Deondre Bailey MD    Intubation:     Induction:  Intravenous    Intubated:  Postinduction    Mask Ventilation:  Easy mask    Attempts:  1    Attempted By:  CRNA    Method of Intubation:  Direct    Blade:  Gonzalez 2    Laryngeal View Grade: Grade I - full view of cords      Difficult Airway Encountered?: No      Complications:  None    Airway Device:  Oral endotracheal tube    Airway Device Size:  7.0    Style/Cuff Inflation:  Cuffed (inflated to minimal occlusive pressure)    Inflation Amount (mL):  3    Tube secured:  21    Secured at:  The lips    Placement Verified By:  Capnometry    Complicating Factors:  None    Findings Post-Intubation:  BS equal bilateral

## 2025-01-06 NOTE — OP NOTE
[facility] - Periop Services 2nd Floor  Operative Note      Date of Procedure: 1/6/2025     Procedure: Excisional hemorrhoidectomy 3 columns    Surgeons and Role:     * Bart Ram MD - Primary    Assisting Surgeon: None    Pre-Operative Diagnosis: Grade III hemorrhoids [K64.2]    Post-Operative Diagnosis: Same    Anesthesia: General    Estimated Blood Loss (EBL): 30 mL           Specimens: Hemorrhoids     Description of Technical Procedures:  After informed consent was obtained, patient was brought to the operating room.  General endotracheal anesthesia was administered by member of the anesthesia team.  Patient was placed in the prone alicia-knife position.  Buttocks were taped apart for exposure.  The perianal skin was prepped and draped in sterile surgical fashion.  A medium Hill-Johnson retractor was inserted and all 4 quadrants inspected.  She had 3 enlarged internal/external hemorrhoidal columns.  The right posterior was excised 1st.  Marcaine with epinephrine was used for local anesthesia.  The anoderm was incised sharply with a 15 blade and the hemorrhoidal column was carefully dissected off of the underlying sphincter complex with Metzenbaum scissors.  Excision was completed with electrocautery.  The hemorrhoid was sent for permanent pathology.  Hemostasis was achieved with spot cautery.  The anoderm was reapproximated with running locking 2-0 chromic suture.  The right anterior and left lateral hemorrhoidal columns were excised similarly.  Upon completion all suture lines were hemostatic.  A hydrocortisone suppository was placed within the rectum.  Lidocaine cream was applied externally covered with a dry fluff dressing secured with tape.  Patient tolerated the procedure well and there were no complications.  Patient was returned to the supine position, awakened, extubated and subsequently transferred to recovery in satisfactory condition.

## 2025-01-06 NOTE — TRANSFER OF CARE
"Anesthesia Transfer of Care Note    Patient: Marilynn Robles    Procedure(s) Performed: Procedure(s) (LRB):  HEMORRHOIDECTOMY INVOLVING TWO OR MORE ANAL COLUMNS (N/A)    Patient location: PACU    Anesthesia Type: general    Transport from OR: Transported from OR on room air with adequate spontaneous ventilation    Post pain: adequate analgesia    Post assessment: no apparent anesthetic complications    Post vital signs: stable    Level of consciousness: sedated    Nausea/Vomiting: no nausea/vomiting    Complications: none    Transfer of care protocol was followed      Last vitals: Visit Vitals  /67   Pulse 78   Temp 36.7 °C (98 °F) (Oral)   Ht 5' 5" (1.651 m)   Wt 72.6 kg (160 lb)   SpO2 96%   Breastfeeding No   BMI 26.63 kg/m²     "

## 2025-01-07 LAB — PSYCHE PATHOLOGY RESULT: NORMAL

## 2025-01-12 ENCOUNTER — PATIENT MESSAGE (OUTPATIENT)
Dept: SURGICAL ONCOLOGY | Facility: CLINIC | Age: 50
End: 2025-01-12
Payer: COMMERCIAL

## 2025-01-13 DIAGNOSIS — K64.2 GRADE III HEMORRHOIDS: ICD-10-CM

## 2025-01-13 RX ORDER — HYDROCODONE BITARTRATE AND ACETAMINOPHEN 10; 325 MG/1; MG/1
1 TABLET ORAL EVERY 6 HOURS PRN
Qty: 20 TABLET | Refills: 0 | Status: SHIPPED | OUTPATIENT
Start: 2025-01-13 | End: 2025-01-27

## 2025-01-27 ENCOUNTER — OFFICE VISIT (OUTPATIENT)
Dept: SURGICAL ONCOLOGY | Facility: CLINIC | Age: 50
End: 2025-01-27
Payer: COMMERCIAL

## 2025-01-27 VITALS
DIASTOLIC BLOOD PRESSURE: 72 MMHG | OXYGEN SATURATION: 97 % | WEIGHT: 164 LBS | HEART RATE: 108 BPM | HEIGHT: 65 IN | BODY MASS INDEX: 27.32 KG/M2 | SYSTOLIC BLOOD PRESSURE: 103 MMHG

## 2025-01-27 DIAGNOSIS — Z98.890 S/P HEMORRHOIDECTOMY: Primary | ICD-10-CM

## 2025-01-27 DIAGNOSIS — Z87.19 S/P HEMORRHOIDECTOMY: Primary | ICD-10-CM

## 2025-01-27 PROCEDURE — 3074F SYST BP LT 130 MM HG: CPT | Mod: CPTII,S$GLB,, | Performed by: COLON & RECTAL SURGERY

## 2025-01-27 PROCEDURE — 3078F DIAST BP <80 MM HG: CPT | Mod: CPTII,S$GLB,, | Performed by: COLON & RECTAL SURGERY

## 2025-01-27 PROCEDURE — 99024 POSTOP FOLLOW-UP VISIT: CPT | Mod: S$GLB,,, | Performed by: COLON & RECTAL SURGERY

## 2025-01-27 PROCEDURE — 1160F RVW MEDS BY RX/DR IN RCRD: CPT | Mod: CPTII,S$GLB,, | Performed by: COLON & RECTAL SURGERY

## 2025-01-27 PROCEDURE — 1159F MED LIST DOCD IN RCRD: CPT | Mod: CPTII,S$GLB,, | Performed by: COLON & RECTAL SURGERY

## 2025-01-27 PROCEDURE — 99999 PR PBB SHADOW E&M-EST. PATIENT-LVL III: CPT | Mod: PBBFAC,,, | Performed by: COLON & RECTAL SURGERY

## 2025-01-27 NOTE — PROGRESS NOTES
"   Patient ID: [id number]     HPI:     Marilynn Robles is a 49 y.o. female here today for a post op visit.  Doing better.  Reports burning after defecation.  Denies fever, bleeding.      Path - hemorrhoids    Current Outpatient Medications   Medication Instructions    albuterol (PROVENTIL/VENTOLIN HFA) 90 mcg/actuation inhaler USE 2 INHALATIONS BY MOUTH EVERY 6 HOURS AS NEEDED FOR WHEEZING    ALPRAZolam (XANAX) 0.5 mg, Oral, Nightly PRN    atenoloL (TENORMIN) 25 MG tablet See Instructions, Take 1 tablet by mouth once daily, # 90 tab(s), 3 Refill(s), Pharmacy: [facility], 164, cm, Height/Length Dosing, 08/15/21 8:13:00 CDT, 65.7, kg, Weight Dosing, 08/15/21 8:13:00 CDT    buPROPion (WELLBUTRIN XL) 150 mg, Oral, 2 times daily    DULoxetine (CYMBALTA) 60 mg, Daily    gabapentin (NEURONTIN) 600 mg, Oral, 3 times daily    HYDROcodone-acetaminophen (NORCO)  mg per tablet 1 tablet, Oral, Every 6 hours PRN    lisinopriL-hydrochlorothiazide (PRINZIDE,ZESTORETIC) 20-12.5 mg per tablet 1 tablet, Oral, Daily    pantoprazole (PROTONIX) 40 mg, Oral, Daily    potassium chloride (MICRO-K) 10 MEQ CpSR 10 mEq, Oral, Daily    valACYclovir (VALTREX) 1,000 mg, Oral, 2 times daily       Patient is allergic to aspirin, ibuprofen, penicillins, and codeine.     Patient Care Team:  Jada Escobedo MD as PCP - General (Family Medicine)  Fab Bedoya MD as Consulting Physician (Gastroenterology)  Allan Pearce MD (Plastic Surgery)  Eagle Larios DO (Surgery)  Isaiah Brooks MD as Consulting Physician (Cardiothoracic Surgery)  Ivan Menon Jr., MD (Inactive) as Consulting Physician (Obstetrics and Gynecology)  Kirti Rubio MD (Dermatology)       Objective:     Visit Vitals  /72 (BP Location: Left arm, Patient Position: Sitting)   Pulse 108   Ht 5' 5" (1.651 m)   Wt 74.4 kg (164 lb)   SpO2 97%   BMI 27.29 kg/m²       Physical Exam    General: Alert and oriented, No acute " distress.  Head: Normocephalic, Atraumatic.  Eye: Sclera non-icteric.  Respiratory: Non-labored respirations, Symmetrical chest wall expansion.  Gastrointestinal: Soft, Non-distended. Non-tender.   Rectal: RA incision with superficial dehiscence.  Extremities: No lower extremity edema.  Integumentary: Warm, Dry, Intact.  Neurologic: No focal deficits.      Assessment:       ICD-10-CM ICD-9-CM   1. S/P hemorrhoidectomy  Z98.890 V45.89    Z87.19         Plan:     RTC 2-3 weeks      No follow-ups on file. In addition to their scheduled follow up, the patient has also been instructed to follow up on as needed basis.     Future Appointments   Date Time Provider Department Center   2/13/2025 10:00 AM [facility] [address]   3/13/2025 11:00 AM Jada Escobedo MD [facility] [place]    5/15/2025  8:30 AM Guicho Purvis AGACNP-BC [facility] [address] [place] [address]        Bart Ram MD

## 2025-02-10 LAB — BCS RECOMMENDATION EXT: NORMAL

## 2025-02-13 ENCOUNTER — OFFICE VISIT (OUTPATIENT)
Dept: SURGICAL ONCOLOGY | Facility: CLINIC | Age: 50
End: 2025-02-13
Payer: COMMERCIAL

## 2025-02-13 VITALS
SYSTOLIC BLOOD PRESSURE: 113 MMHG | BODY MASS INDEX: 27.39 KG/M2 | DIASTOLIC BLOOD PRESSURE: 78 MMHG | HEIGHT: 65 IN | WEIGHT: 164.38 LBS | HEART RATE: 64 BPM

## 2025-02-13 DIAGNOSIS — Z87.19 S/P HEMORRHOIDECTOMY: Primary | ICD-10-CM

## 2025-02-13 DIAGNOSIS — Z98.890 S/P HEMORRHOIDECTOMY: Primary | ICD-10-CM

## 2025-02-13 PROCEDURE — 99999 PR PBB SHADOW E&M-EST. PATIENT-LVL III: CPT | Mod: PBBFAC,,, | Performed by: COLON & RECTAL SURGERY

## 2025-02-13 NOTE — PROGRESS NOTES
"   Patient ID: [id number]     HPI:     Marilynn Robles is a 49 y.o. female here today for a post op visit.  Doing much better.  Having daily BMs.  Denies pain and bleeding.    Current Outpatient Medications   Medication Instructions    albuterol (PROVENTIL/VENTOLIN HFA) 90 mcg/actuation inhaler USE 2 INHALATIONS BY MOUTH EVERY 6 HOURS AS NEEDED FOR WHEEZING    ALPRAZolam (XANAX) 0.5 mg, Oral, Nightly PRN    atenoloL (TENORMIN) 25 MG tablet See Instructions, Take 1 tablet by mouth once daily, # 90 tab(s), 3 Refill(s), Pharmacy: [facility], 164, cm, Height/Length Dosing, 08/15/21 8:13:00 CDT, 65.7, kg, Weight Dosing, 08/15/21 8:13:00 CDT    DULoxetine (CYMBALTA) 60 mg, Daily    gabapentin (NEURONTIN) 600 mg, Oral, 3 times daily    lisinopriL-hydrochlorothiazide (PRINZIDE,ZESTORETIC) 20-12.5 mg per tablet 1 tablet, Oral, Daily    pantoprazole (PROTONIX) 40 mg, Oral, Daily    potassium chloride (MICRO-K) 10 MEQ CpSR 10 mEq, Oral, Daily    valACYclovir (VALTREX) 1,000 mg, Oral, 2 times daily       Patient is allergic to aspirin, ibuprofen, penicillins, and codeine.     Patient Care Team:  Jada Escobedo MD as PCP - General (Family Medicine)  Fab Bedoya MD as Consulting Physician (Gastroenterology)  Allan Pearce MD (Plastic Surgery)  Eagle Larios DO (Surgery)  Isaiah Brooks MD as Consulting Physician (Cardiothoracic Surgery)  Ivan Menon Jr., MD (Inactive) as Consulting Physician (Obstetrics and Gynecology)  Kirti Rubio MD (Dermatology)       Objective:     Visit Vitals  /78 (BP Location: Left arm, Patient Position: Sitting)   Pulse 64   Ht 5' 5" (1.651 m)   Wt 74.6 kg (164 lb 6.4 oz)   SpO2 (P) 100%   BMI 27.36 kg/m²       Physical Exam    General: Alert and oriented, No acute distress.  Head: Normocephalic, Atraumatic.  Eye: Sclera non-icteric.  Respiratory: Non-labored respirations, Symmetrical chest wall expansion.  Gastrointestinal: Soft, Non-distended. " Non-tender.   Rectal: Healing well.  Extremities: No lower extremity edema.  Integumentary: Warm, Dry, Intact.  Neurologic: No focal deficits.      Assessment:       ICD-10-CM ICD-9-CM   1. S/P hemorrhoidectomy  Z98.890 V45.89    Z87.19         Plan:     RTC PRN    No follow-ups on file. In addition to their scheduled follow up, the patient has also been instructed to follow up on as needed basis.     Future Appointments   Date Time Provider Department Center   3/13/2025 11:00 AM Jada Escobedo MD [facility] [place]    5/15/2025  8:30 AM Guicho Purvis, AGACNP-BC [facility] [address] [place] [address]        Bart Ram MD

## 2025-02-24 DIAGNOSIS — F41.1 GAD (GENERALIZED ANXIETY DISORDER): ICD-10-CM

## 2025-02-24 RX ORDER — ALPRAZOLAM 0.5 MG/1
0.5 TABLET ORAL NIGHTLY PRN
Qty: 90 TABLET | Refills: 3 | OUTPATIENT
Start: 2025-02-24

## 2025-04-11 DIAGNOSIS — M54.6 ACUTE LEFT-SIDED THORACIC BACK PAIN: ICD-10-CM

## 2025-04-14 RX ORDER — GABAPENTIN 300 MG/1
600 CAPSULE ORAL 3 TIMES DAILY
Qty: 180 CAPSULE | Refills: 11 | OUTPATIENT
Start: 2025-04-14

## 2025-04-23 ENCOUNTER — TELEPHONE (OUTPATIENT)
Dept: NEUROLOGY | Facility: CLINIC | Age: 50
End: 2025-04-23
Payer: COMMERCIAL

## 2025-04-23 DIAGNOSIS — G43.109 MIGRAINE WITH AURA AND WITHOUT STATUS MIGRAINOSUS, NOT INTRACTABLE: Primary | ICD-10-CM

## 2025-04-23 RX ORDER — DULOXETIN HYDROCHLORIDE 60 MG/1
60 CAPSULE, DELAYED RELEASE ORAL DAILY
Qty: 30 CAPSULE | Refills: 0 | Status: SHIPPED | OUTPATIENT
Start: 2025-04-23

## 2025-05-15 ENCOUNTER — OFFICE VISIT (OUTPATIENT)
Dept: NEUROLOGY | Facility: CLINIC | Age: 50
End: 2025-05-15
Payer: COMMERCIAL

## 2025-05-15 ENCOUNTER — PATIENT MESSAGE (OUTPATIENT)
Dept: NEUROLOGY | Facility: CLINIC | Age: 50
End: 2025-05-15

## 2025-05-15 DIAGNOSIS — M79.2 NEURALGIA: ICD-10-CM

## 2025-05-15 DIAGNOSIS — G43.109 MIGRAINE WITH AURA AND WITHOUT STATUS MIGRAINOSUS, NOT INTRACTABLE: Primary | ICD-10-CM

## 2025-05-15 PROCEDURE — 1159F MED LIST DOCD IN RCRD: CPT | Mod: CPTII,95,, | Performed by: NURSE PRACTITIONER

## 2025-05-15 PROCEDURE — 98004 SYNCH AUDIO-VIDEO EST SF 10: CPT | Mod: 95,,, | Performed by: NURSE PRACTITIONER

## 2025-05-15 PROCEDURE — 4010F ACE/ARB THERAPY RXD/TAKEN: CPT | Mod: CPTII,95,, | Performed by: NURSE PRACTITIONER

## 2025-05-15 PROCEDURE — 1160F RVW MEDS BY RX/DR IN RCRD: CPT | Mod: CPTII,95,, | Performed by: NURSE PRACTITIONER

## 2025-05-15 RX ORDER — DULOXETIN HYDROCHLORIDE 60 MG/1
60 CAPSULE, DELAYED RELEASE ORAL DAILY
Qty: 90 CAPSULE | Refills: 3 | Status: SHIPPED | OUTPATIENT
Start: 2025-05-15

## 2025-05-15 NOTE — PROGRESS NOTES
Virtual Visit Note    Subjective:          Patient ID: Marilynn Robles is a 49 y.o. female.    Chief Complaint: routine follow up for head pain, headaches    HPI:           The patient location is: vehicle  Visit type: audiovisual    Overall, doing well    Gabapentin 600 mg Tid and Duloxetine remains efficacious - she can definitely tell if she missed dose of gabapentin    If she drinks 4 or more beers, she gets head pain and has to lay down. She consumes ETOH 2x/week on average       This is a telemedicine note. After obtaining verbal consent/patient identification using name and , patient was treated using real time audio/video, according to [facility] protocols. IGuicho NP [distant provider], conducted the visit from location identified below. The patient participated in the visit at a non-[facility] location selected by the patient (or patients representative), identified below. I am licensed in the state where the patient stated they are located. The patient (or patients representative) stated that they understood and accepted the privacy and security risks to their information at their location.    Distant provider was located at [facility]    Each patient to whom he or she provides medical services by telemedicine is:  (1) informed of the relationship between the physician and patient and the respective role of any other health care provider with respect to management of the patient; and (2) notified that he or she may decline to receive medical services by telemedicine and may withdraw from such care at any time.          Past Medical History:   Diagnosis Date    Anxiety disorder, unspecified     Asymptomatic varicose veins of unspecified lower extremity     Cellulitis of right breast 10/10/2022    COVID-19 2022    Paxlovid for 5 days initiated today, Tessalon as needed for cough, ER precautions for respiratory distress despite current management with home  breathing treatments as needed every 4 hours for persistent wheezing.    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 residual right head pain    Thrombosed hemorrhoids 10/05/2022       Past Surgical History:   Procedure Laterality Date    APPENDECTOMY  1993    BANDING LIGATION  11/14/2019 11/18/19,12/9/19,12/27/21    BREAST BIOPSY Right     CLOSURE, SURGICAL WOUND OR DEHISCENCE, EXTENSIVE OR COMPLEX, SECONDARY      after hysterectomy    HEMORRHOIDECTOMY INVOLVING TWO OR MORE ANAL COLUMNS N/A 1/6/2025    Procedure: HEMORRHOIDECTOMY INVOLVING TWO OR MORE ANAL COLUMNS;  Surgeon: Bart Ram MD;  Location: [address] OR;  Service: Colon and Rectal;  Laterality: N/A;    hemorroid operation      SINUS SURGERY      TOTAL ABDOMINAL HYSTERECTOMY         Family History   Problem Relation Name Age of Onset    Arthritis Mother Beatriz Senia     Hypertension Mother Beatriz Senia     Stroke Mother Beatriz Senia     Arthritis Father Sukhjinder Robles Jr.     Stroke Maternal Grandmother Bonnie Lee Travis     Arthritis Son Mick Hester     Arthritis Sister Alejandra Ch     Hypertension Sister Alejandra Ch     Arthritis Brother Saeid Asencio     Hypertension Brother Saeid Asencio     Arthritis Son Azeem Hester        Social History     Socioeconomic History    Marital status:    Tobacco Use    Smoking status: Former     Current packs/day: 1.00     Average packs/day: 1 pack/day for 14.4 years (14.4 ttl pk-yrs)     Types: Cigarettes     Start date: 2011     Smokeless tobacco: Never    Tobacco comments:     In total about 15yrs smoked 1ppd   Substance and Sexual Activity    Alcohol use: Yes     Alcohol/week: 8.0 standard drinks of alcohol     Types: 8 Cans of beer per week     Comment: 1 x month    Drug use: Never    Sexual activity: Yes     Partners: Male     Birth control/protection: None     Social Drivers of Health     Financial Resource Strain: Low Risk  (9/3/2024)    Overall Financial Resource Strain (CARDIA)     Difficulty of Paying Living Expenses: Not very hard   Food Insecurity: No Food Insecurity (9/3/2024)    Hunger Vital Sign     Worried About Running Out of Food in the Last Year: Never true     Ran Out of Food in the Last Year: Never true   Transportation Needs: No Transportation Needs (7/24/2024)    TRANSPORTATION NEEDS     Transportation : No   Physical Activity: Inactive (9/3/2024)    Exercise Vital Sign     Days of Exercise per Week: 0 days     Minutes of Exercise per Session: 0 min   Stress: Stress Concern Present (9/3/2024)    [employer] - Occupational Stress Questionnaire     Feeling of Stress : Very much   Housing Stability: Unknown (9/3/2024)    Housing Stability Vital Sign     Unable to Pay for Housing in the Last Year: No     Homeless in the Last Year: No       Review of patient's allergies indicates:   Allergen Reactions    Aspirin Shortness Of Breath     Other reaction(s): severe asthma attack    Ibuprofen      Other reaction(s): severe asthma attack    Penicillins      Other reaction(s): severe asthma attack    Codeine Anxiety     Other reaction(s): makes her anxious       Current Outpatient Medications   Medication Instructions    albuterol (PROVENTIL/VENTOLIN HFA) 90 mcg/actuation inhaler USE 2 INHALATIONS BY MOUTH EVERY 6 HOURS AS NEEDED FOR WHEEZING    ALPRAZolam (XANAX) 0.5 mg, Oral, Nightly PRN    atenoloL (TENORMIN) 25 MG tablet See Instructions, Take 1 tablet by mouth once daily, # 90 tab(s), 3 Refill(s),  Pharmacy: [id number], 164, cm, Height/Length Dosing, 08/15/21 8:13:00 CDT, 65.7, kg, Weight Dosing, 08/15/21 8:13:00 CDT    DULoxetine (CYMBALTA) 60 mg, Oral, Daily    gabapentin (NEURONTIN) 600 mg, Oral, 3 times daily    lisinopriL-hydrochlorothiazide (PRINZIDE,ZESTORETIC) 20-12.5 mg per tablet 1 tablet, Oral, Daily    pantoprazole (PROTONIX) 40 mg, Oral, Daily    potassium chloride (MICRO-K) 10 MEQ CpSR 10 mEq, Oral, Daily    valACYclovir (VALTREX) 1,000 mg, Oral, 2 times daily         Objective:      Physical Exam:  General- Patient alert and oriented x3 in NAD  Speech - nml  HEENT- EOMI  Resp- No increased WOB noted. Not using accessory muscles.  Skin-  No Jaundice. No visible skin lesions.        Assessment/Plan:     Problem List Items Addressed This Visit       Neuralgia    Migraine with aura and without status migrainosus, not intractable - Primary    Overall, doing better    Continue the Gabapentin 600 mg TID and the Duloxetine 60 mg daily     Refills for duloxetine generated     Follow up 1 year          Guicho Purvis, MSN, APRN, AGACNP-BC

## 2025-06-06 DIAGNOSIS — M54.6 ACUTE LEFT-SIDED THORACIC BACK PAIN: Primary | ICD-10-CM

## 2025-06-09 RX ORDER — GABAPENTIN 300 MG/1
600 CAPSULE ORAL 3 TIMES DAILY
Qty: 180 CAPSULE | Refills: 11 | Status: SHIPPED | OUTPATIENT
Start: 2025-06-09

## 2025-06-16 ENCOUNTER — PATIENT MESSAGE (OUTPATIENT)
Dept: ADMINISTRATIVE | Facility: HOSPITAL | Age: 50
End: 2025-06-16
Payer: COMMERCIAL

## 2025-06-16 ENCOUNTER — PATIENT OUTREACH (OUTPATIENT)
Facility: CLINIC | Age: 50
End: 2025-06-16
Payer: COMMERCIAL

## 2025-06-16 NOTE — LETTER
AUTHORIZATION FOR RELEASE OF CONFIDENTIAL INFORMATION      2025      Dear HERNAN/Andres,    We are seeing Marilynn Robles, date of birth 1975, in the clinic at [facility].  Jada Escobedo MD is the patient's PCP. Marilynn Robles has an outstanding lab/procedure at the time we reviewed his chart.  In order to help keep her health information updated, Marilynn has authorized us to request the following medical record(s):        Mammogram           Please fax any records to Jada Escobedo MD's at  [phone]            Patient Name: Marilynn Robles  :1975  Patient Phone #:[phone]

## 2025-06-19 NOTE — PROGRESS NOTES
Health Maintenance Topic(s) Outreach Outcomes & Actions Taken:    Breast Cancer Screening - Outreach Outcomes & Actions Taken  : External Records Uploaded & Care Team Updated if Applicable       Additional Notes:  RENNY 2/10/25

## (undated) DEVICE — ELECTRODE PATIENT RETURN DISP

## (undated) DEVICE — PENCIL ELECSURG ROCKER 15FT

## (undated) DEVICE — SEE MEDLINE ITEM 154981

## (undated) DEVICE — BLADE SURG STAINLESS STEEL #15

## (undated) DEVICE — NDL SYR 10ML 18X1.5 LL BLUNT

## (undated) DEVICE — BENZOIN TINCTURE 0.66ML

## (undated) DEVICE — SOL NACL IRR 1000ML BTL

## (undated) DEVICE — HEADREST DERMAPROX PED 7IN

## (undated) DEVICE — SUT CHROMIC 2-0 SH 27IN BRN

## (undated) DEVICE — Device

## (undated) DEVICE — BANDAGE GAUZE COT STRL 4.5X4.1

## (undated) DEVICE — SUPPORT ULNA NERVE PROTECTOR

## (undated) DEVICE — GLOVE SENSICARE PI GRN 8

## (undated) DEVICE — GLOVE SIGNATURE MICRO LTX 7.5

## (undated) DEVICE — CONTAINER SPECIMEN SCREW 4OZ

## (undated) DEVICE — NDL HYPO REG 25G X 1 1/2

## (undated) DEVICE — PANTIES FEMININE NAPKIN LG/XLG